# Patient Record
Sex: FEMALE | Race: WHITE | NOT HISPANIC OR LATINO | Employment: FULL TIME | URBAN - METROPOLITAN AREA
[De-identification: names, ages, dates, MRNs, and addresses within clinical notes are randomized per-mention and may not be internally consistent; named-entity substitution may affect disease eponyms.]

---

## 2018-05-22 ENCOUNTER — APPOINTMENT (OUTPATIENT)
Dept: LAB | Facility: CLINIC | Age: 23
End: 2018-05-22
Payer: COMMERCIAL

## 2018-05-22 ENCOUNTER — TRANSCRIBE ORDERS (OUTPATIENT)
Dept: LAB | Facility: CLINIC | Age: 23
End: 2018-05-22

## 2018-05-22 DIAGNOSIS — Z79.899 ENCOUNTER FOR LONG-TERM (CURRENT) USE OF MEDICATIONS: Primary | ICD-10-CM

## 2018-05-22 LAB
ANION GAP SERPL CALCULATED.3IONS-SCNC: 6 MMOL/L (ref 4–13)
B-HCG SERPL-ACNC: <2 MIU/ML
BUN SERPL-MCNC: 10 MG/DL (ref 5–25)
CALCIUM SERPL-MCNC: 8.4 MG/DL (ref 8.3–10.1)
CHLORIDE SERPL-SCNC: 107 MMOL/L (ref 100–108)
CO2 SERPL-SCNC: 26 MMOL/L (ref 21–32)
CREAT SERPL-MCNC: 0.77 MG/DL (ref 0.6–1.3)
GFR SERPL CREATININE-BSD FRML MDRD: 109 ML/MIN/1.73SQ M
GLUCOSE P FAST SERPL-MCNC: 96 MG/DL (ref 65–99)
POTASSIUM SERPL-SCNC: 3.9 MMOL/L (ref 3.5–5.3)
SODIUM SERPL-SCNC: 139 MMOL/L (ref 136–145)

## 2018-05-22 PROCEDURE — 80048 BASIC METABOLIC PNL TOTAL CA: CPT

## 2018-05-22 PROCEDURE — 84702 CHORIONIC GONADOTROPIN TEST: CPT

## 2018-05-22 PROCEDURE — 36415 COLL VENOUS BLD VENIPUNCTURE: CPT

## 2018-06-21 ENCOUNTER — APPOINTMENT (OUTPATIENT)
Dept: LAB | Facility: CLINIC | Age: 23
End: 2018-06-21
Payer: COMMERCIAL

## 2018-06-21 ENCOUNTER — TRANSCRIBE ORDERS (OUTPATIENT)
Dept: LAB | Facility: CLINIC | Age: 23
End: 2018-06-21

## 2018-06-21 DIAGNOSIS — Z79.899 ENCOUNTER FOR LONG-TERM (CURRENT) USE OF MEDICATIONS: Primary | ICD-10-CM

## 2018-06-21 LAB — POTASSIUM SERPL-SCNC: 3.9 MMOL/L (ref 3.5–5.3)

## 2018-06-21 PROCEDURE — 84132 ASSAY OF SERUM POTASSIUM: CPT

## 2018-06-21 PROCEDURE — 36415 COLL VENOUS BLD VENIPUNCTURE: CPT

## 2018-06-25 ENCOUNTER — OFFICE VISIT (OUTPATIENT)
Dept: FAMILY MEDICINE CLINIC | Facility: CLINIC | Age: 23
End: 2018-06-25
Payer: COMMERCIAL

## 2018-06-25 VITALS
TEMPERATURE: 98.3 F | HEIGHT: 64 IN | BODY MASS INDEX: 22.2 KG/M2 | SYSTOLIC BLOOD PRESSURE: 100 MMHG | WEIGHT: 130 LBS | DIASTOLIC BLOOD PRESSURE: 62 MMHG | OXYGEN SATURATION: 100 % | HEART RATE: 77 BPM

## 2018-06-25 DIAGNOSIS — H91.90 HEARING DIFFICULTY, UNSPECIFIED LATERALITY: Primary | ICD-10-CM

## 2018-06-25 PROCEDURE — 99213 OFFICE O/P EST LOW 20 MIN: CPT | Performed by: FAMILY MEDICINE

## 2018-06-25 PROCEDURE — 3008F BODY MASS INDEX DOCD: CPT | Performed by: FAMILY MEDICINE

## 2018-06-25 RX ORDER — DOXYCYCLINE HYCLATE 20 MG
TABLET ORAL
COMMUNITY
Start: 2018-06-19 | End: 2018-12-05

## 2018-06-25 RX ORDER — SPIRONOLACTONE 50 MG/1
TABLET, FILM COATED ORAL
COMMUNITY
Start: 2018-06-19 | End: 2019-10-21 | Stop reason: SDUPTHER

## 2018-06-25 RX ORDER — NORETHINDRONE ACETATE AND ETHINYL ESTRADIOL AND FERROUS FUMARATE 1.5-30(21)
KIT ORAL
COMMUNITY
Start: 2018-06-01 | End: 2018-10-05 | Stop reason: SDUPTHER

## 2018-06-25 NOTE — PROGRESS NOTES
Assessment/Plan:     Diagnoses and all orders for this visit:    Hearing difficulty, unspecified laterality  -     Cancel: Ambulatory Referral to Otolaryngology; Future  -     Ambulatory Referral to Otolaryngology; Future    BMI 22 0-22 9, adult    Other orders  -     doxycycline (PERIOSTAT) 20 MG tablet;   -     MICROGESTIN FE 1 5/30 1 5-30 MG-MCG tablet;   -     spironolactone (ALDACTONE) 50 mg tablet;   -     tretinoin (RETIN-A) 0 025 % cream;   -     Multiple Vitamin (MULTI-VITAMIN DAILY PO); Take by mouth      Joyce has been having decreased hearing for the past month with no improvement after a bout of cold-like symptoms  Hearing test conducted in-office today was within normal limits  On exam, she did have slightly bullous TM on right and serous fluid  Recommended continued observation for the time being  If no improvement in 3-4 weeks, referral provided for ENT for further evaluation  She acknowledged understanding and agreement with the plan    Subjective:      Patient ID: Robyn Johnson is a 21 y o  female  RIP Cook is a 21year old female that comes to the office due to concerns of hearing loss  She reports one month history of muffled sounds, worse on the right  It was preceded by cold-like symptoms  Over the past month, she has had no improvement in her symptoms  Denies fever, chills, ear pressure, ear pain, ear drainage  No history of ear infections as a child  Further denies current cold-like symptoms  She would like to get her hearing tested  The following portions of the patient's history were reviewed and updated as appropriate: allergies, current medications, past family history, past medical history, past social history, past surgical history and problem list     Review of Systems   Constitutional: Negative for chills and fever  HENT: Positive for hearing loss  Negative for congestion, dental problem, ear discharge, ear pain, rhinorrhea and sore throat      Eyes: Negative for visual disturbance  Respiratory: Negative for cough, shortness of breath and wheezing  Cardiovascular: Negative for chest pain and palpitations  Gastrointestinal: Negative for abdominal pain, constipation, diarrhea, nausea and vomiting  Genitourinary: Negative for dysuria  Musculoskeletal: Negative for myalgias  Skin: Negative for rash  Neurological: Negative for weakness, numbness and headaches  Psychiatric/Behavioral: Negative for confusion  Objective:      /62   Pulse 77   Temp 98 3 °F (36 8 °C) (Tympanic)   Ht 5' 4" (1 626 m)   Wt 59 kg (130 lb)   SpO2 100%   BMI 22 31 kg/m²          Physical Exam   Constitutional: She is oriented to person, place, and time  She appears well-developed and well-nourished  No distress  HENT:   Head: Normocephalic and atraumatic  Right Ear: External ear normal  No drainage or swelling  Tympanic membrane is not scarred and not erythematous  A middle ear effusion (serous) is present  No decreased hearing is noted  Left Ear: External ear normal  No drainage, swelling or tenderness  Tympanic membrane is not scarred and not erythematous  No middle ear effusion  No decreased hearing is noted  Bullous TM on right   Eyes: Conjunctivae and EOM are normal  Pupils are equal, round, and reactive to light  Right eye exhibits no discharge  Left eye exhibits no discharge  Neck: Neck supple  Cardiovascular: Normal rate, regular rhythm and normal heart sounds  No murmur heard  Pulmonary/Chest: Effort normal and breath sounds normal  No respiratory distress  She has no wheezes  Abdominal: Soft  Bowel sounds are normal  There is no tenderness  Musculoskeletal: Normal range of motion  She exhibits no edema or tenderness  Neurological: She is alert and oriented to person, place, and time  Skin: Skin is warm and dry  She is not diaphoretic  Psychiatric: She has a normal mood and affect

## 2018-09-14 ENCOUNTER — OFFICE VISIT (OUTPATIENT)
Dept: FAMILY MEDICINE CLINIC | Facility: CLINIC | Age: 23
End: 2018-09-14
Payer: COMMERCIAL

## 2018-09-14 VITALS
BODY MASS INDEX: 23 KG/M2 | SYSTOLIC BLOOD PRESSURE: 110 MMHG | TEMPERATURE: 98.5 F | WEIGHT: 134 LBS | DIASTOLIC BLOOD PRESSURE: 70 MMHG

## 2018-09-14 DIAGNOSIS — R59.0 LYMPHADENOPATHY OF RIGHT CERVICAL REGION: Primary | ICD-10-CM

## 2018-09-14 PROCEDURE — 99213 OFFICE O/P EST LOW 20 MIN: CPT | Performed by: NURSE PRACTITIONER

## 2018-09-14 NOTE — PROGRESS NOTES
Assessment/Plan:  1  Advised patient that if this lymph node becomes bigger her more painful to return to the office as well as if other symptoms developed  2  Advised patient that this lymph node may take up to a month to resolve and to return to the office at that time if she does not notice any improvement  3  Follow up if condition changes or worsens     Diagnoses and all orders for this visit:    Lymphadenopathy of right cervical region          Subjective:      Patient ID: León Sanchez is a 21 y o  female  45-year-old female presents with lump in the right side of her neck for couple of days  Denies any recent illnesses  Denies medications  The following portions of the patient's history were reviewed and updated as appropriate: allergies and current medications  Review of Systems   Constitutional: Negative  Skin:        Lump in right side of neck         Objective:      /70   Temp 98 5 °F (36 9 °C)   Wt 60 8 kg (134 lb)   LMP 08/24/2018   Breastfeeding? No   BMI 23 00 kg/m²          Physical Exam   Constitutional: She appears well-developed and well-nourished  Lymphadenopathy:     She has cervical adenopathy (Palpable Anterior cervical lymph node approximately half a cm/tender/mobile/no erythema)

## 2018-10-05 ENCOUNTER — OFFICE VISIT (OUTPATIENT)
Dept: OBGYN CLINIC | Facility: CLINIC | Age: 23
End: 2018-10-05
Payer: COMMERCIAL

## 2018-10-05 ENCOUNTER — APPOINTMENT (OUTPATIENT)
Dept: LAB | Facility: AMBULARY SURGERY CENTER | Age: 23
End: 2018-10-05
Payer: COMMERCIAL

## 2018-10-05 VITALS
DIASTOLIC BLOOD PRESSURE: 60 MMHG | HEIGHT: 64 IN | BODY MASS INDEX: 22.71 KG/M2 | SYSTOLIC BLOOD PRESSURE: 102 MMHG | WEIGHT: 133 LBS

## 2018-10-05 DIAGNOSIS — Z30.011 ENCOUNTER FOR INITIAL PRESCRIPTION OF CONTRACEPTIVE PILLS: ICD-10-CM

## 2018-10-05 DIAGNOSIS — Z11.3 SCREENING EXAMINATION FOR STD (SEXUALLY TRANSMITTED DISEASE): ICD-10-CM

## 2018-10-05 DIAGNOSIS — Z01.419 WELL WOMAN EXAM WITH ROUTINE GYNECOLOGICAL EXAM: Primary | ICD-10-CM

## 2018-10-05 LAB
HAV IGM SER QL: NORMAL
HBV CORE IGM SER QL: NORMAL
HBV SURFACE AG SER QL: NORMAL
HCV AB SER QL: NORMAL
RPR SER QL: NORMAL

## 2018-10-05 PROCEDURE — S0612 ANNUAL GYNECOLOGICAL EXAMINA: HCPCS | Performed by: OBSTETRICS & GYNECOLOGY

## 2018-10-05 PROCEDURE — 87389 HIV-1 AG W/HIV-1&-2 AB AG IA: CPT

## 2018-10-05 PROCEDURE — 36415 COLL VENOUS BLD VENIPUNCTURE: CPT

## 2018-10-05 PROCEDURE — 87491 CHLMYD TRACH DNA AMP PROBE: CPT | Performed by: OBSTETRICS & GYNECOLOGY

## 2018-10-05 PROCEDURE — 86592 SYPHILIS TEST NON-TREP QUAL: CPT

## 2018-10-05 PROCEDURE — 80074 ACUTE HEPATITIS PANEL: CPT

## 2018-10-05 PROCEDURE — G0145 SCR C/V CYTO,THINLAYER,RESCR: HCPCS | Performed by: OBSTETRICS & GYNECOLOGY

## 2018-10-05 PROCEDURE — 87591 N.GONORRHOEAE DNA AMP PROB: CPT | Performed by: OBSTETRICS & GYNECOLOGY

## 2018-10-05 RX ORDER — NORETHINDRONE ACETATE AND ETHINYL ESTRADIOL AND FERROUS FUMARATE 1.5-30(21)
1 KIT ORAL DAILY
Qty: 28 TABLET | Refills: 11 | Status: SHIPPED | OUTPATIENT
Start: 2018-10-05 | End: 2019-02-28 | Stop reason: SDUPTHER

## 2018-10-05 NOTE — PROGRESS NOTES
ASSESSMENT & PLAN: Silvio Plasencia is a 21 y o  Solomon Briceño with normal gynecologic exam     1   Routine well woman exam done today  2    Pap and HPV: Pap with reflex HPV was today  Current ASCCP Guidelines reviewed  3   The patient refused STD testing  chlamydia, gonorrhea, HIV and hepatitis, syphilis testing performed  Safe sex practices have been discussed  4   Gardasil is recommended for patients from 526 years of age  The patient has had the Gardasil vaccine series  5  The patient is sexually active  She accepted contraception and options have been discussed  6  The following were reviewed in today's visit: breast self exam, STD testing, HIV risk factors and prevention and use and side effects of OCPs  7  Patient to return to office in 12 months for annual well woman exam      All questions have been answered to her satisfaction  CC:  Annual Gynecologic Examination    HPI: Silvio Plasencia is a 21 y o  Solomon Briceño who presents for annual gynecologic examination  She has the following concerns:  Occasional spotting after sex  She denies any paid with sex  This has only happened a few times over the past month  She has had this once before about a year ago and she was diagnosed with chlamydia  She is currently with a different partner  She has not been tested since becoming sexually active with this partner  Health Maintenance:    She exercises 5 days per week  She wears her seatbelt routinely  She does not perform regular monthly self breast exams  She feels safe at home  She does follow a well balanced diet  She does not use tobacco    History reviewed  No pertinent past medical history  History reviewed  No pertinent surgical history      Past OB/Gyn History:  Period Cycle (Days): 28  Period Duration (Days): 4  Period Pattern: Regular  Menstrual Flow: Light  Menstrual Control: Tampon  Dysmenorrhea: (!) Moderate  Dysmenorrhea Symptoms: CrampingPatient's last menstrual period was 2018  Menstrual History:  OB History      Para Term  AB Living    0 0 0 0 0 0    SAB TAB Ectopic Multiple Live Births    0 0 0 0 0         Menarche age: 15  History of sexually transmitted infection Yes - chlamydia (2017)  Patient is currently sexually active  heterosexual Birth control: combination OCPs  Obstetric History       T0      L0     SAB0   TAB0   Ectopic0   Multiple0   Live Births0           Family History:  Family History   Problem Relation Age of Onset   Tiara Hollins Chang's esophagus Mother     Breast cancer Paternal Grandfather        Social History:  Social History     Social History    Marital status: Single     Spouse name: N/A    Number of children: N/A    Years of education: N/A     Occupational History    Not on file  Social History Main Topics    Smoking status: Never Smoker    Smokeless tobacco: Never Used    Alcohol use Yes      Comment: social    Drug use: No    Sexual activity: Yes     Partners: Male     Birth control/ protection: OCP     Other Topics Concern    Not on file     Social History Narrative    Lives with parents per [employer]     Presently lives with parents  Patient is single  Allergies   Allergen Reactions    Sulfa Antibiotics Hives and Rash       Current Outpatient Prescriptions:     doxycycline (PERIOSTAT) 20 MG tablet, , Disp: , Rfl:     MICROGESTIN FE  1 5-30 MG-MCG tablet, , Disp: , Rfl:     Multiple Vitamin (MULTI-VITAMIN DAILY PO), Take by mouth, Disp: , Rfl:     spironolactone (ALDACTONE) 50 mg tablet, , Disp: , Rfl:     tretinoin (RETIN-A) 0 025 % cream, , Disp: , Rfl:     Review of Systems:  Denies fevers, chills, unintentional weight loss, excessive fatigue, chest pain, shortness of breath, abdominal pain, nausea, vomiting, urinary incontinence, urinary frequency, vaginal bleeding, vaginal discharge  All other systems negative unless otherwise stated       Physical Exam:  /60   Ht 5' 3 5" (1 613 m) Wt 60 3 kg (133 lb)   LMP 09/17/2018   BMI 23 19 kg/m²  Body mass index is 23 19 kg/m²  GEN: The patient was alert and oriented x3, pleasant well-appearing female in no acute distress  HEENT:  Unremarkable, no anterior or posterior lymphadenopathy, no thyromegaly  CV:  Regular rate and rhythm, normal S1 and S2, no murmurs  RESP:  Clear to auscultation bilaterally, no wheezes, rales or rhonchi  BREAST:  Symmetric breasts with no palpable breast masses or obvious breast lesions  She has no retractions or nipple discharge  She has no axillary abnormalities or palpable masses  GI:  Soft, nontender, non-distended  MSK: bilateral lower extremities are nontender, no edema  : Normal appearing external female genitalia, normal appearing urethral meatus  On sterile speculum exam, normal appearing vaginal epithelium, no vaginal discharge, no bleeding, cervix appears friable but otherwise normal, no obvious lesions  On bimanual exam, no cervical motion tenderness; uterus is smooth, mobile, nontender 6 weeks size  No tenderness or fullness in the bilateral adnexa

## 2018-10-08 LAB
CHLAMYDIA DNA CVX QL NAA+PROBE: ABNORMAL
HIV 1+2 AB+HIV1 P24 AG SERPL QL IA: NORMAL
N GONORRHOEA DNA GENITAL QL NAA+PROBE: ABNORMAL

## 2018-10-09 DIAGNOSIS — A74.9 CHLAMYDIA: Primary | ICD-10-CM

## 2018-10-09 RX ORDER — AZITHROMYCIN 500 MG/1
TABLET, FILM COATED ORAL
Qty: 2 TABLET | Refills: 0 | Status: SHIPPED | OUTPATIENT
Start: 2018-10-09 | End: 2018-10-09

## 2018-10-10 LAB
LAB AP GYN PRIMARY INTERPRETATION: NORMAL
Lab: NORMAL

## 2018-12-05 ENCOUNTER — OFFICE VISIT (OUTPATIENT)
Dept: URGENT CARE | Facility: CLINIC | Age: 23
End: 2018-12-05
Payer: COMMERCIAL

## 2018-12-05 VITALS
HEIGHT: 64 IN | DIASTOLIC BLOOD PRESSURE: 72 MMHG | BODY MASS INDEX: 22.88 KG/M2 | SYSTOLIC BLOOD PRESSURE: 120 MMHG | HEART RATE: 82 BPM | TEMPERATURE: 98.7 F | WEIGHT: 134 LBS | OXYGEN SATURATION: 100 % | RESPIRATION RATE: 18 BRPM

## 2018-12-05 DIAGNOSIS — J02.9 ACUTE PHARYNGITIS, UNSPECIFIED ETIOLOGY: Primary | ICD-10-CM

## 2018-12-05 PROCEDURE — 99213 OFFICE O/P EST LOW 20 MIN: CPT | Performed by: PHYSICIAN ASSISTANT

## 2018-12-05 PROCEDURE — 87070 CULTURE OTHR SPECIMN AEROBIC: CPT | Performed by: PHYSICIAN ASSISTANT

## 2018-12-05 NOTE — PROGRESS NOTES
[facility]        NAME: Chandrakant Abraham is a 21 y o  female  : 1995    MRN: [id number]  DATE: 2018  TIME: 6:15 PM    Assessment and Plan   Acute pharyngitis, unspecified etiology [J02 9]  1  Acute pharyngitis, unspecified etiology  Throat culture     Patient Instructions   Begin salt water gargles, warm tea with honey, and using throat lozenges for throat relief  Call in 48-72 hours for the result of your throat culture  An antibiotic will be started at this time if necessary  Follow up with your family doctor in 3-5 days  Proceed to the ER if symptoms worsen  Discussed with patient unable to perform rapid strep in this office  Will send for culture  tx plan reviewed in length  All questions answered  Precautions given  Pt verbalized understanding and agreement with this tx plan  Chief Complaint     Chief Complaint   Patient presents with    Sore Throat     Sore throat since Saturday with sl  dry cough  Today can "taste blood"  Took DayQuil at 6 am       History of Present Illness     20 y/o female presenting with c/o sore throat x 4 days  Sore throat is constant, worse with swallowing  She denies difficulty swallowing  She notes an occasional dry cough, but states this is not bothersome and is unsure of known exacerbations  She has been treating with Dayquil and Nyquil, noting nyquil helps her to fall asleep  She notes a friend sick with a cold, but otherwise no sick contacts  No recent travel  She did not have the flu vaccine  Otherwise UTD on vaccines  Review of Systems   Review of Systems   Constitutional: Negative for chills, diaphoresis, fatigue and fever  HENT: Negative for congestion, ear pain, postnasal drip and rhinorrhea  Respiratory: Negative for cough, shortness of breath and wheezing  Cardiovascular: Negative for chest pain and palpitations  Gastrointestinal: Negative for abdominal pain, diarrhea, nausea and vomiting     Musculoskeletal: Negative for arthralgias and myalgias  Skin: Negative for rash  Neurological: Negative for dizziness, light-headedness and headaches  Current Medications       Current Outpatient Prescriptions:     MICROGESTIN FE 1 5/30 1 5-30 MG-MCG tablet, Take 1 tablet by mouth daily, Disp: 28 tablet, Rfl: 11    Multiple Vitamin (MULTI-VITAMIN DAILY PO), Take by mouth, Disp: , Rfl:     spironolactone (ALDACTONE) 50 mg tablet, , Disp: , Rfl:     tretinoin (RETIN-A) 0 025 % cream, , Disp: , Rfl:     Current Allergies     Allergies as of 12/05/2018 - Reviewed 12/05/2018   Allergen Reaction Noted    Sulfa antibiotics Hives and Rash 11/08/2013          The following portions of the patient's history were reviewed and updated as appropriate: allergies, current medications, past family history, past medical history, past social history, past surgical history and problem list      Past Medical History:   Diagnosis Date    Acne      Past Surgical History:   Procedure Laterality Date    NO PAST SURGERIES       Family History   Problem Relation Age of Onset   Joe Pert Chang's esophagus Mother     Breast cancer Paternal Grandfather      Medications have been verified  Objective   /72 (BP Location: Left arm, Patient Position: Sitting, Cuff Size: Standard)   Pulse 82   Temp 98 7 °F (37 1 °C) (Tympanic)   Resp 18   Ht 5' 4" (1 626 m)   Wt 60 8 kg (134 lb)   LMP 11/07/2018 (Approximate)   SpO2 100%   BMI 23 00 kg/m²      Physical Exam     Physical Exam   Constitutional: She is oriented to person, place, and time  Vital signs are normal  She appears well-developed and well-nourished  She is cooperative  She does not appear ill  No distress  HENT:   Head: Normocephalic and atraumatic  Right Ear: Hearing, tympanic membrane, external ear and ear canal normal    Left Ear: Hearing, tympanic membrane, external ear and ear canal normal    Nose: Nose normal  No mucosal edema or rhinorrhea  Mouth/Throat: Uvula is midline   Mucous membranes are not pale, not dry and not cyanotic  No oral lesions  Oropharyngeal exudate (White patchy exudate on right tonsil ) and posterior oropharyngeal erythema present  No posterior oropharyngeal edema or tonsillar abscesses  Tonsils grade II b/l  No petechiae  Eyes: Conjunctivae are normal  Right eye exhibits no discharge  Left eye exhibits no discharge  No scleral icterus  Neck: Neck supple  Cardiovascular: Normal rate, regular rhythm and normal heart sounds  Pulmonary/Chest: Effort normal and breath sounds normal  No respiratory distress  She has no decreased breath sounds  She has no wheezes  She has no rhonchi  She has no rales  Abdominal: Soft  Bowel sounds are normal  She exhibits no distension and no mass  There is no tenderness  There is no rebound and no guarding  Lymphadenopathy:     She has no cervical adenopathy  Neurological: She is alert and oriented to person, place, and time  She has normal reflexes  No cranial nerve deficit  She exhibits normal muscle tone  Coordination normal    Skin: Skin is warm and dry  No rash noted  She is not diaphoretic  Psychiatric: She has a normal mood and affect  Her behavior is normal    Nursing note and vitals reviewed

## 2018-12-05 NOTE — PATIENT INSTRUCTIONS
Begin salt water gargles, warm tea with honey, and using throat lozenges for throat relief  Call in 48-72 hours for the result of your throat culture  An antibiotic will be started at this time if necessary  Follow up with your family doctor in 3-5 days  Proceed to the ER if symptoms worsen

## 2018-12-06 ENCOUNTER — TELEPHONE (OUTPATIENT)
Dept: URGENT CARE | Facility: CLINIC | Age: 23
End: 2018-12-06

## 2018-12-07 LAB — BACTERIA THROAT CULT: NORMAL

## 2019-01-14 ENCOUNTER — APPOINTMENT (OUTPATIENT)
Dept: LAB | Facility: AMBULARY SURGERY CENTER | Age: 24
End: 2019-01-14
Attending: OBSTETRICS & GYNECOLOGY
Payer: COMMERCIAL

## 2019-01-14 DIAGNOSIS — A74.9 CHLAMYDIA: ICD-10-CM

## 2019-01-14 PROCEDURE — 87591 N.GONORRHOEAE DNA AMP PROB: CPT

## 2019-01-14 PROCEDURE — 87491 CHLMYD TRACH DNA AMP PROBE: CPT

## 2019-01-16 LAB
C TRACH DNA SPEC QL NAA+PROBE: NEGATIVE
N GONORRHOEA DNA SPEC QL NAA+PROBE: NEGATIVE

## 2019-02-28 DIAGNOSIS — Z30.011 ENCOUNTER FOR INITIAL PRESCRIPTION OF CONTRACEPTIVE PILLS: ICD-10-CM

## 2019-02-28 RX ORDER — NORETHINDRONE ACETATE AND ETHINYL ESTRADIOL AND FERROUS FUMARATE 1.5-30(21)
1 KIT ORAL DAILY
Qty: 28 TABLET | Refills: 11 | Status: SHIPPED | OUTPATIENT
Start: 2019-02-28 | End: 2019-03-06 | Stop reason: SDUPTHER

## 2019-02-28 NOTE — TELEPHONE ENCOUNTER
Pt has a new insurance and would like the rest of her birth control script sent to the [facility] in [place] (updated in chart)  Thank you!

## 2019-03-05 ENCOUNTER — TELEPHONE (OUTPATIENT)
Dept: OBGYN CLINIC | Facility: CLINIC | Age: 24
End: 2019-03-05

## 2019-03-05 NOTE — TELEPHONE ENCOUNTER
Mother calling states she has to use [facility]  Please sent Migrogestin to [id number]  Pharmacy updated  Routing to provider to order

## 2019-03-06 DIAGNOSIS — Z30.011 ENCOUNTER FOR INITIAL PRESCRIPTION OF CONTRACEPTIVE PILLS: ICD-10-CM

## 2019-03-06 RX ORDER — NORETHINDRONE ACETATE AND ETHINYL ESTRADIOL AND FERROUS FUMARATE 1.5-30(21)
1 KIT ORAL DAILY
Qty: 84 TABLET | Refills: 3 | Status: SHIPPED | OUTPATIENT
Start: 2019-03-06 | End: 2020-03-05

## 2019-03-06 NOTE — TELEPHONE ENCOUNTER
Spoke with mother advised prescription has been sent to [facility] order  Verbalized understanding

## 2019-05-21 ENCOUNTER — TELEPHONE (OUTPATIENT)
Dept: FAMILY MEDICINE CLINIC | Facility: CLINIC | Age: 24
End: 2019-05-21

## 2019-05-22 ENCOUNTER — TRANSCRIBE ORDERS (OUTPATIENT)
Dept: ADMINISTRATIVE | Facility: HOSPITAL | Age: 24
End: 2019-05-22

## 2019-05-22 DIAGNOSIS — R53.83 FATIGUE, UNSPECIFIED TYPE: Primary | ICD-10-CM

## 2019-05-23 ENCOUNTER — APPOINTMENT (OUTPATIENT)
Dept: LAB | Facility: HOSPITAL | Age: 24
End: 2019-05-23
Attending: FAMILY MEDICINE
Payer: COMMERCIAL

## 2019-05-23 ENCOUNTER — TRANSCRIBE ORDERS (OUTPATIENT)
Dept: ADMINISTRATIVE | Facility: HOSPITAL | Age: 24
End: 2019-05-23

## 2019-05-23 DIAGNOSIS — R53.83 FATIGUE, UNSPECIFIED TYPE: ICD-10-CM

## 2019-05-23 LAB
ERYTHROCYTE [DISTWIDTH] IN BLOOD BY AUTOMATED COUNT: 12 % (ref 11.6–15.1)
HCT VFR BLD AUTO: 39.2 % (ref 34.8–46.1)
HGB BLD-MCNC: 13.5 G/DL (ref 11.5–15.4)
MCH RBC QN AUTO: 31.5 PG (ref 26.8–34.3)
MCHC RBC AUTO-ENTMCNC: 34.4 G/DL (ref 31.4–37.4)
MCV RBC AUTO: 91 FL (ref 82–98)
PLATELET # BLD AUTO: 256 THOUSANDS/UL (ref 149–390)
PMV BLD AUTO: 11.2 FL (ref 8.9–12.7)
RBC # BLD AUTO: 4.29 MILLION/UL (ref 3.81–5.12)
TSH SERPL DL<=0.05 MIU/L-ACNC: 1.17 UIU/ML (ref 0.36–3.74)
WBC # BLD AUTO: 5.64 THOUSAND/UL (ref 4.31–10.16)

## 2019-05-23 PROCEDURE — 86617 LYME DISEASE ANTIBODY: CPT

## 2019-05-23 PROCEDURE — 85027 COMPLETE CBC AUTOMATED: CPT

## 2019-05-23 PROCEDURE — 36415 COLL VENOUS BLD VENIPUNCTURE: CPT

## 2019-05-23 PROCEDURE — 84443 ASSAY THYROID STIM HORMONE: CPT

## 2019-05-23 PROCEDURE — 86618 LYME DISEASE ANTIBODY: CPT

## 2019-05-24 ENCOUNTER — TELEPHONE (OUTPATIENT)
Dept: FAMILY MEDICINE CLINIC | Facility: CLINIC | Age: 24
End: 2019-05-24

## 2019-05-25 LAB
B BURGDOR IGG SER IA-ACNC: 0.07
B BURGDOR IGM SER IA-ACNC: 1.42

## 2019-05-28 LAB
B BURGDOR IGG PATRN SER IB-IMP: NEGATIVE
B BURGDOR IGM PATRN SER IB-IMP: NEGATIVE
B BURGDOR18KD IGG SER QL IB: NORMAL
B BURGDOR23KD IGG SER QL IB: NORMAL
B BURGDOR23KD IGM SER QL IB: NORMAL
B BURGDOR28KD IGG SER QL IB: NORMAL
B BURGDOR30KD IGG SER QL IB: NORMAL
B BURGDOR39KD IGG SER QL IB: NORMAL
B BURGDOR39KD IGM SER QL IB: NORMAL
B BURGDOR41KD IGG SER QL IB: NORMAL
B BURGDOR41KD IGM SER QL IB: NORMAL
B BURGDOR45KD IGG SER QL IB: NORMAL
B BURGDOR58KD IGG SER QL IB: NORMAL
B BURGDOR66KD IGG SER QL IB: NORMAL
B BURGDOR93KD IGG SER QL IB: NORMAL

## 2019-05-29 ENCOUNTER — TELEPHONE (OUTPATIENT)
Dept: FAMILY MEDICINE CLINIC | Facility: CLINIC | Age: 24
End: 2019-05-29

## 2019-05-29 ENCOUNTER — OFFICE VISIT (OUTPATIENT)
Dept: FAMILY MEDICINE CLINIC | Facility: CLINIC | Age: 24
End: 2019-05-29
Payer: COMMERCIAL

## 2019-05-29 VITALS
HEART RATE: 83 BPM | DIASTOLIC BLOOD PRESSURE: 70 MMHG | HEIGHT: 64 IN | BODY MASS INDEX: 22.53 KG/M2 | SYSTOLIC BLOOD PRESSURE: 110 MMHG | WEIGHT: 132 LBS | OXYGEN SATURATION: 100 %

## 2019-05-29 DIAGNOSIS — F32.A ANXIETY AND DEPRESSION: Primary | ICD-10-CM

## 2019-05-29 DIAGNOSIS — F41.9 ANXIETY AND DEPRESSION: Primary | ICD-10-CM

## 2019-05-29 PROCEDURE — 99213 OFFICE O/P EST LOW 20 MIN: CPT | Performed by: FAMILY MEDICINE

## 2019-05-29 PROCEDURE — 3008F BODY MASS INDEX DOCD: CPT | Performed by: FAMILY MEDICINE

## 2019-05-29 RX ORDER — ESCITALOPRAM OXALATE 10 MG/1
10 TABLET ORAL DAILY
Qty: 90 TABLET | Refills: 1 | Status: SHIPPED | OUTPATIENT
Start: 2019-05-29 | End: 2019-07-03

## 2019-07-03 DIAGNOSIS — F41.9 ANXIETY: Primary | ICD-10-CM

## 2019-07-03 RX ORDER — CITALOPRAM 20 MG/1
20 TABLET ORAL DAILY
Qty: 30 TABLET | Refills: 5 | Status: SHIPPED | OUTPATIENT
Start: 2019-07-03 | End: 2019-07-04

## 2019-07-04 DIAGNOSIS — F41.9 ANXIETY: ICD-10-CM

## 2019-07-04 RX ORDER — CITALOPRAM 20 MG/1
20 TABLET ORAL DAILY
Qty: 30 TABLET | Refills: 5 | Status: SHIPPED | OUTPATIENT
Start: 2019-07-04 | End: 2019-08-05 | Stop reason: SDUPTHER

## 2019-07-10 ENCOUNTER — APPOINTMENT (OUTPATIENT)
Dept: LAB | Facility: HOSPITAL | Age: 24
End: 2019-07-10
Payer: COMMERCIAL

## 2019-07-10 ENCOUNTER — TRANSCRIBE ORDERS (OUTPATIENT)
Dept: ADMINISTRATIVE | Facility: HOSPITAL | Age: 24
End: 2019-07-10

## 2019-07-10 DIAGNOSIS — Z79.899 ENCOUNTER FOR LONG-TERM (CURRENT) USE OF OTHER MEDICATIONS: Primary | ICD-10-CM

## 2019-07-10 LAB
ANION GAP SERPL CALCULATED.3IONS-SCNC: 7 MMOL/L (ref 4–13)
BUN SERPL-MCNC: 15 MG/DL (ref 5–25)
CALCIUM SERPL-MCNC: 8.4 MG/DL (ref 8.3–10.1)
CHLORIDE SERPL-SCNC: 105 MMOL/L (ref 100–108)
CO2 SERPL-SCNC: 28 MMOL/L (ref 21–32)
CREAT SERPL-MCNC: 0.71 MG/DL (ref 0.6–1.3)
GFR SERPL CREATININE-BSD FRML MDRD: 120 ML/MIN/1.73SQ M
GLUCOSE P FAST SERPL-MCNC: 84 MG/DL (ref 65–99)
POTASSIUM SERPL-SCNC: 3.9 MMOL/L (ref 3.5–5.3)
SODIUM SERPL-SCNC: 140 MMOL/L (ref 136–145)

## 2019-07-10 PROCEDURE — 80048 BASIC METABOLIC PNL TOTAL CA: CPT | Performed by: PHYSICIAN ASSISTANT

## 2019-07-10 PROCEDURE — 36415 COLL VENOUS BLD VENIPUNCTURE: CPT | Performed by: PHYSICIAN ASSISTANT

## 2019-08-05 DIAGNOSIS — F41.9 ANXIETY: ICD-10-CM

## 2019-08-06 RX ORDER — CITALOPRAM 20 MG/1
20 TABLET ORAL DAILY
Qty: 30 TABLET | Refills: 5 | Status: SHIPPED | OUTPATIENT
Start: 2019-08-06 | End: 2020-01-13 | Stop reason: SDUPTHER

## 2019-08-26 ENCOUNTER — APPOINTMENT (OUTPATIENT)
Dept: RADIOLOGY | Facility: CLINIC | Age: 24
End: 2019-08-26
Payer: COMMERCIAL

## 2019-08-26 ENCOUNTER — OFFICE VISIT (OUTPATIENT)
Dept: URGENT CARE | Facility: CLINIC | Age: 24
End: 2019-08-26
Payer: COMMERCIAL

## 2019-08-26 VITALS
SYSTOLIC BLOOD PRESSURE: 136 MMHG | HEIGHT: 64 IN | TEMPERATURE: 98.2 F | BODY MASS INDEX: 23.66 KG/M2 | WEIGHT: 138.6 LBS | RESPIRATION RATE: 16 BRPM | DIASTOLIC BLOOD PRESSURE: 80 MMHG | OXYGEN SATURATION: 100 % | HEART RATE: 80 BPM

## 2019-08-26 DIAGNOSIS — L03.012 PARONYCHIA OF LEFT INDEX FINGER: ICD-10-CM

## 2019-08-26 DIAGNOSIS — M79.642 LEFT HAND PAIN: ICD-10-CM

## 2019-08-26 DIAGNOSIS — S60.122A CONTUSION OF LEFT INDEX FINGER WITH DAMAGE TO NAIL, INITIAL ENCOUNTER: Primary | ICD-10-CM

## 2019-08-26 PROCEDURE — 99213 OFFICE O/P EST LOW 20 MIN: CPT | Performed by: PHYSICIAN ASSISTANT

## 2019-08-26 PROCEDURE — 90471 IMMUNIZATION ADMIN: CPT

## 2019-08-26 PROCEDURE — 73130 X-RAY EXAM OF HAND: CPT

## 2019-08-26 PROCEDURE — 90715 TDAP VACCINE 7 YRS/> IM: CPT

## 2019-08-26 RX ORDER — CEPHALEXIN 500 MG/1
500 CAPSULE ORAL EVERY 8 HOURS SCHEDULED
Qty: 21 CAPSULE | Refills: 0 | Status: SHIPPED | OUTPATIENT
Start: 2019-08-26 | End: 2019-09-02

## 2019-08-26 NOTE — PATIENT INSTRUCTIONS
Rest and elevate your hand as often as possible  Apply ice for 20-30 minutes at a time, 3-4 times per day  Take  Advil or Tylenol as directed for pain  If your nail begins to fall off, do not rip it away  Apply a bandage and allow it to fall off naturally  Take the antibiotic as directed with food and water  Take a probiotic while taking this medication  Apply a warm compress or soak the finger for 15-20 minutes at a time, 2-3 times daily  After soaking, milk your finger going away from your body  Keep the area clean washing with soap and water  Pat dry  Monitor for signs of worsening infection including increasing redness, streaking redness, persistent pus formation, fevers, chills, and sweats  Seek care immediately if these symptoms occur  Follow up with your family doctor in 3-5 days  Proceed to the ER if symptoms worsen

## 2019-08-26 NOTE — PROGRESS NOTES
[facility]        NAME: Rosetta Jamil is a 25 y o  female  : 1995    MRN: [id number]  DATE: 2019  TIME: 5:51 PM    Assessment and Plan   Contusion of left index finger with damage to nail, initial encounter [S60 122A]  1  Contusion of left index finger with damage to nail, initial encounter  XR hand 3+ vw left    TDAP Vaccine greater than or equal to 6yo   2  Paronychia of left index finger  cephalexin (KEFLEX) 500 mg capsule     Patient Instructions   Rest and elevate your hand as often as possible  Apply ice for 20-30 minutes at a time, 3-4 times per day  Take  Advil or Tylenol as directed for pain  If your nail begins to fall off, do not rip it away  Apply a bandage and allow it to fall off naturally  Take the antibiotic as directed with food and water  Take a probiotic while taking this medication  Apply a warm compress or soak the finger for 15-20 minutes at a time, 2-3 times daily  After soaking, milk your finger going away from your body  Keep the area clean washing with soap and water  Pat dry  Monitor for signs of worsening infection including increasing redness, streaking redness, persistent pus formation, fevers, chills, and sweats  Seek care immediately if these symptoms occur  Follow up with your family doctor in 3-5 days  Proceed to the ER if symptoms worsen  Chief Complaint     Chief Complaint   Patient presents with    Hand Injury     L index finger pain and swelling after hitting with hammer yesterday  Nail appears blue, pt tried to drill hold in it to drain blood, no relief  Taking advil for pain     History of Present Illness   59-year-old female presenting with complaint of left index finger pain x1 day  Pain began after she accidentally struck her finger with a hammer yesterday  She reports her father attempted to remove bruising from beneath the nail using a drill bit, which was successful of draining a small amount of blood   Since then reports swelling of the distal finger with associated sharp and throbbing pain  Pain is worse with movement, with pt noting decreased mobility of the digit  Notes tingling sensation with pressure application, otherwise no numbness or weakness  No previous injury to this digit  RHD  Unsure of last tetanus date  Review of Systems   Review of Systems   Constitutional: Negative for chills, diaphoresis and fever  Gastrointestinal: Negative for abdominal pain, nausea and vomiting  Musculoskeletal: Positive for arthralgias  Skin: Positive for color change  Neurological: Negative for weakness, numbness and headaches  Current Medications       Current Outpatient Medications:     citalopram (CeleXA) 20 mg tablet, Take 1 tablet (20 mg total) by mouth daily, Disp: 30 tablet, Rfl: 5    MICROGESTIN FE 1 5/30 1 5-30 MG-MCG tablet, Take 1 tablet by mouth daily, Disp: 84 tablet, Rfl: 3    Multiple Vitamin (MULTI-VITAMIN DAILY PO), Take by mouth, Disp: , Rfl:     spironolactone (ALDACTONE) 50 mg tablet, , Disp: , Rfl:     tretinoin (RETIN-A) 0 025 % cream, , Disp: , Rfl:     cephalexin (KEFLEX) 500 mg capsule, Take 1 capsule (500 mg total) by mouth every 8 (eight) hours for 7 days, Disp: 21 capsule, Rfl: 0    Current Allergies     Allergies as of 08/26/2019 - Reviewed 08/26/2019   Allergen Reaction Noted    Sulfa antibiotics Hives and Rash 11/08/2013            The following portions of the patient's history were reviewed and updated as appropriate: allergies, current medications, past family history, past medical history, past social history, past surgical history and problem list      Past Medical History:   Diagnosis Date    Acne        Past Surgical History:   Procedure Laterality Date    NO PAST SURGERIES         Family History   Problem Relation Age of Onset    Chang's esophagus Mother     Breast cancer Paternal Grandfather      Medications have been verified      Objective   /80 (BP Location: Right arm, Patient Position: Sitting, Cuff Size: Standard)   Pulse 80   Temp 98 2 °F (36 8 °C) (Tympanic)   Resp 16   Ht 5' 4" (1 626 m)   Wt 62 9 kg (138 lb 9 6 oz)   SpO2 100%   BMI 23 79 kg/m²      Left hand XR: no acute osseous abn  Physical Exam     Physical Exam   Constitutional: She is oriented to person, place, and time  Vital signs are normal  She appears well-developed and well-nourished  She is cooperative  She does not appear ill  No distress  HENT:   Head: Normocephalic and atraumatic  Eyes: Conjunctivae and lids are normal  Right eye exhibits no chemosis, no discharge and no exudate  Left eye exhibits no chemosis, no discharge and no exudate  Right conjunctiva is not injected  Left conjunctiva is not injected  Cardiovascular: Normal rate, regular rhythm and normal heart sounds  Exam reveals no gallop, no distant heart sounds and no friction rub  No murmur heard  Pulmonary/Chest: Effort normal and breath sounds normal  No stridor  No respiratory distress  She has no decreased breath sounds  She has no wheezes  She has no rhonchi  She has no rales  Musculoskeletal:   Edema and ecchymosis extending distally from the DIP joint of the left index finger  Area is TTP  LROM at DIP secondary to pain and edema  No overt or palpable deformity  No crepitus  Puncture wound near base of 2nd nail consistent with hx of attempts at trephination  Subungual hematoma occupying 60% of nail bed  Mild warm erythema extending 0 5 cm from cuticle line with non fluctuant pallor at base of nail bed, likely paronychia secondary to trauma  Distal digit is neurovascularly intact  Digital strength 5/5  Neurological: She is alert and oriented to person, place, and time  She has normal strength  She is not disoriented  No cranial nerve deficit  She exhibits normal muscle tone  Coordination and gait normal    Skin: Skin is warm, dry and intact  No rash noted  She is not diaphoretic  No erythema  No pallor     Psychiatric: She has a normal mood and affect  Her behavior is normal  Judgment and thought content normal    Nursing note and vitals reviewed

## 2019-10-21 DIAGNOSIS — L70.9 ACNE, UNSPECIFIED ACNE TYPE: Primary | ICD-10-CM

## 2019-10-21 RX ORDER — SPIRONOLACTONE 50 MG/1
50 TABLET, FILM COATED ORAL DAILY
Qty: 45 TABLET | Refills: 0 | Status: SHIPPED | OUTPATIENT
Start: 2019-10-21 | End: 2019-11-26 | Stop reason: SDUPTHER

## 2019-10-31 ENCOUNTER — ANNUAL EXAM (OUTPATIENT)
Dept: OBGYN CLINIC | Facility: CLINIC | Age: 24
End: 2019-10-31
Payer: COMMERCIAL

## 2019-10-31 VITALS
BODY MASS INDEX: 23.73 KG/M2 | HEIGHT: 64 IN | SYSTOLIC BLOOD PRESSURE: 120 MMHG | WEIGHT: 139 LBS | DIASTOLIC BLOOD PRESSURE: 70 MMHG

## 2019-10-31 DIAGNOSIS — Z01.419 WELL WOMAN EXAM WITH ROUTINE GYNECOLOGICAL EXAM: Primary | ICD-10-CM

## 2019-10-31 PROCEDURE — 99395 PREV VISIT EST AGE 18-39: CPT | Performed by: OBSTETRICS & GYNECOLOGY

## 2019-10-31 NOTE — PROGRESS NOTES
ASSESSMENT & PLAN: Karine Sawyer is a 25 y o  Ana Javier with normal gynecologic exam     1   Routine well woman exam done today  2   Pap and HPV: Pap with reflex HPV was not today  Current ASCCP Guidelines reviewed  Next pap due 2021  3  The patient declined STD testing  She has not had a new partner since her last negative GCC test    4   Gardasil is recommended for patients from 526 years of age  The patient has had the Gardasil vaccine series  5  The patient is sexually active  She relies on OCPs for contraception and options have been discussed  She is up to date on refills currently - will need more in March  6  The following were reviewed in today's visit: breast self exam, STD testing, use and side effects of OCPs, exercise and healthy diet  7  Patient to return to office in 12 months for annual exam      All questions have been answered to her satisfaction  CC:  Annual Gynecologic Examination    HPI: Karine Sawyer is a 25 y o  Ana Javier who presents for annual gynecologic examination  She has the following concerns:  none    Health Maintenance:    She exercises 3 days per week  She wears her seatbelt routinely  She does not perform regular monthly self breast exams  She feels safe at home  She does follow a well balanced diet  She does not use tobacco    Past Medical History:   Diagnosis Date    Acne        Past Surgical History:   Procedure Laterality Date    NO PAST SURGERIES         Past OB/Gyn History:      Period Cycle (Days): 30  Period Duration (Days): 4-5  Period Pattern: Regular  Menstrual Flow: Light, Moderate  Dysmenorrhea: (!) Mild  Dysmenorrhea Symptoms: CrampingPatient's last menstrual period was 10/08/2019  History of sexually transmitted infection yes, chlamydia  Patient is currently sexually active  heterosexual Birth control: combination OCPs  Last Pap 10/2018 :  no abnormalities        Family History:  Family History   Problem Relation Age of Onset    Chang's esophagus Mother     Breast cancer Paternal Grandfather        Social History:  Social History     Socioeconomic History    Marital status: Single     Spouse name: Not on file    Number of children: Not on file    Years of education: Not on file    Highest education level: Not on file   Occupational History    Not on file   Social Needs    Financial resource strain: Not on file    Food insecurity:     Worry: Not on file     Inability: Not on file    Transportation needs:     Medical: Not on file     Non-medical: Not on file   Tobacco Use    Smoking status: Never Smoker    Smokeless tobacco: Never Used   Substance and Sexual Activity    Alcohol use: Yes     Alcohol/week: 3 0 standard drinks     Types: 3 Standard drinks or equivalent per week     Comment: Occasionally    Drug use: No    Sexual activity: Yes     Partners: Male     Birth control/protection: OCP   Lifestyle    Physical activity:     Days per week: Not on file     Minutes per session: Not on file    Stress: Not on file   Relationships    Social connections:     Talks on phone: Not on file     Gets together: Not on file     Attends Congregational service: Not on file     Active member of club or organization: Not on file     Attends meetings of clubs or organizations: Not on file     Relationship status: Not on file    Intimate partner violence:     Fear of current or ex partner: Not on file     Emotionally abused: Not on file     Physically abused: Not on file     Forced sexual activity: Not on file   Other Topics Concern    Not on file   Social History Narrative    Lives with parents per [employer]     Presently lives with her parents and brother  Patient is single        Allergies   Allergen Reactions    Sulfa Antibiotics Hives and Rash       Current Outpatient Medications:     citalopram (CeleXA) 20 mg tablet, Take 1 tablet (20 mg total) by mouth daily, Disp: 30 tablet, Rfl: 5    MICROGESTIN FE 1 5/30 1 5-30 MG-MCG tablet, Take 1 tablet by mouth daily, Disp: 84 tablet, Rfl: 3    Multiple Vitamin (MULTI-VITAMIN DAILY PO), Take by mouth, Disp: , Rfl:     spironolactone (ALDACTONE) 50 mg tablet, Take 1 tablet (50 mg total) by mouth daily, Disp: 45 tablet, Rfl: 0    tretinoin (RETIN-A) 0 025 % cream, , Disp: , Rfl:     Review of Systems:  Denies fevers, chills, unintentional weight loss, excessive fatigue, chest pain, shortness of breath, abdominal pain, nausea, vomiting, urinary incontinence, urinary frequency, vaginal bleeding, vaginal discharge  All other systems negative unless otherwise stated  Physical Exam:  /70   Ht 5' 4" (1 626 m)   Wt 63 kg (139 lb)   LMP 10/08/2019   BMI 23 86 kg/m²  Body mass index is 23 86 kg/m²  GEN: The patient was alert and oriented x3, pleasant well-appearing female in no acute distress  HEENT:  Unremarkable, no anterior or posterior lymphadenopathy, no thyromegaly  CV:  Regular rate and rhythm, normal S1 and S2, no murmurs  RESP:  Clear to auscultation bilaterally, no wheezes, rales or rhonchi  BREAST:  Symmetric breasts with no palpable breast masses or obvious breast lesions  She has no retractions or nipple discharge  She has no axillary abnormalities or palpable masses  GI:  Soft, nontender, non-distended  MSK: bilateral lower extremities are nontender, no edema  : Normal appearing external female genitalia, normal appearing urethral meatus  Normal vaginal epithelium  On bimanual exam, no cervical motion tenderness; uterus is smooth, mobile, nontender 6 weeks size  No tenderness or fullness in the bilateral adnexa

## 2019-11-26 DIAGNOSIS — L70.9 ACNE, UNSPECIFIED ACNE TYPE: ICD-10-CM

## 2019-11-26 RX ORDER — SPIRONOLACTONE 50 MG/1
50 TABLET, FILM COATED ORAL DAILY
Qty: 45 TABLET | Refills: 0 | Status: SHIPPED | OUTPATIENT
Start: 2019-11-26 | End: 2019-11-26

## 2019-11-26 RX ORDER — SPIRONOLACTONE 50 MG/1
50 TABLET, FILM COATED ORAL DAILY
Qty: 45 TABLET | Refills: 0 | Status: SHIPPED | OUTPATIENT
Start: 2019-11-26 | End: 2021-04-29 | Stop reason: SDUPTHER

## 2019-11-26 RX ORDER — SPIRONOLACTONE 50 MG/1
50 TABLET, FILM COATED ORAL DAILY
Qty: 45 TABLET | Refills: 0 | Status: CANCELLED | OUTPATIENT
Start: 2019-11-26

## 2020-01-13 DIAGNOSIS — F41.9 ANXIETY: ICD-10-CM

## 2020-01-14 RX ORDER — CITALOPRAM 20 MG/1
20 TABLET ORAL DAILY
Qty: 30 TABLET | Refills: 5 | Status: SHIPPED | OUTPATIENT
Start: 2020-01-14 | End: 2020-07-08 | Stop reason: SDUPTHER

## 2020-03-04 ENCOUNTER — TELEPHONE (OUTPATIENT)
Dept: OBGYN CLINIC | Facility: CLINIC | Age: 25
End: 2020-03-04

## 2020-03-04 NOTE — TELEPHONE ENCOUNTER
Left message on nurse line believes she has two tampons in       [facility] patient states she did have 2 tampons in but was able to get them both out  Call with any problems or concerns

## 2020-03-05 ENCOUNTER — TELEPHONE (OUTPATIENT)
Dept: OBGYN CLINIC | Facility: CLINIC | Age: 25
End: 2020-03-05

## 2020-03-05 DIAGNOSIS — Z30.011 ENCOUNTER FOR INITIAL PRESCRIPTION OF CONTRACEPTIVE PILLS: ICD-10-CM

## 2020-03-05 RX ORDER — NORETHINDRONE ACETATE AND ETHINYL ESTRADIOL 1.5-30(21)
1 KIT ORAL DAILY
Qty: 84 TABLET | Refills: 3 | Status: SHIPPED | OUTPATIENT
Start: 2020-03-05 | End: 2020-03-10 | Stop reason: SDUPTHER

## 2020-03-05 RX ORDER — NORETHINDRONE ACETATE AND ETHINYL ESTRADIOL AND FERROUS FUMARATE 1.5-30(21)
KIT ORAL
Qty: 84 TABLET | Refills: 3 | Status: SHIPPED | OUTPATIENT
Start: 2020-03-05 | End: 2020-03-05 | Stop reason: SDUPTHER

## 2020-03-05 NOTE — TELEPHONE ENCOUNTER
Pt would like to know if you can send one month of her o/c to her local [facility] because she is about to run out  Thank you

## 2020-03-06 ENCOUNTER — TELEPHONE (OUTPATIENT)
Dept: PAIN MEDICINE | Facility: CLINIC | Age: 25
End: 2020-03-06

## 2020-03-06 NOTE — TELEPHONE ENCOUNTER
You sent it to her mail order, she is just requesting a month to go to her local pharmacy because she runs out this weekend  Is that ok?

## 2020-03-10 ENCOUNTER — OFFICE VISIT (OUTPATIENT)
Dept: URGENT CARE | Facility: CLINIC | Age: 25
End: 2020-03-10
Payer: COMMERCIAL

## 2020-03-10 ENCOUNTER — OFFICE VISIT (OUTPATIENT)
Dept: FAMILY MEDICINE CLINIC | Facility: CLINIC | Age: 25
End: 2020-03-10
Payer: COMMERCIAL

## 2020-03-10 VITALS
RESPIRATION RATE: 18 BRPM | DIASTOLIC BLOOD PRESSURE: 76 MMHG | TEMPERATURE: 97.8 F | OXYGEN SATURATION: 100 % | HEIGHT: 64 IN | WEIGHT: 147.4 LBS | BODY MASS INDEX: 25.16 KG/M2 | SYSTOLIC BLOOD PRESSURE: 120 MMHG | HEART RATE: 80 BPM

## 2020-03-10 VITALS
WEIGHT: 147 LBS | TEMPERATURE: 98.2 F | SYSTOLIC BLOOD PRESSURE: 102 MMHG | BODY MASS INDEX: 25.1 KG/M2 | HEIGHT: 64 IN | HEART RATE: 86 BPM | DIASTOLIC BLOOD PRESSURE: 70 MMHG | OXYGEN SATURATION: 99 %

## 2020-03-10 DIAGNOSIS — R51.9 ACUTE NONINTRACTABLE HEADACHE, UNSPECIFIED HEADACHE TYPE: Primary | ICD-10-CM

## 2020-03-10 DIAGNOSIS — Z30.011 ENCOUNTER FOR INITIAL PRESCRIPTION OF CONTRACEPTIVE PILLS: ICD-10-CM

## 2020-03-10 DIAGNOSIS — T19.2XXA FOREIGN BODY IN VAGINA, INITIAL ENCOUNTER: Primary | ICD-10-CM

## 2020-03-10 DIAGNOSIS — R11.0 NAUSEA: ICD-10-CM

## 2020-03-10 LAB
SL AMB  POCT GLUCOSE, UA: NEGATIVE
SL AMB LEUKOCYTE ESTERASE,UA: ABNORMAL
SL AMB POCT BILIRUBIN,UA: NEGATIVE
SL AMB POCT BLOOD,UA: NEGATIVE
SL AMB POCT CLARITY,UA: ABNORMAL
SL AMB POCT COLOR,UA: ABNORMAL
SL AMB POCT KETONES,UA: 40
SL AMB POCT NITRITE,UA: NEGATIVE
SL AMB POCT PH,UA: 6
SL AMB POCT SPECIFIC GRAVITY,UA: 1.02
SL AMB POCT URINE PROTEIN: 30
SL AMB POCT UROBILINOGEN: 0.2

## 2020-03-10 PROCEDURE — 87086 URINE CULTURE/COLONY COUNT: CPT | Performed by: PREVENTIVE MEDICINE

## 2020-03-10 PROCEDURE — 81002 URINALYSIS NONAUTO W/O SCOPE: CPT | Performed by: PREVENTIVE MEDICINE

## 2020-03-10 PROCEDURE — 1036F TOBACCO NON-USER: CPT | Performed by: FAMILY MEDICINE

## 2020-03-10 PROCEDURE — 99213 OFFICE O/P EST LOW 20 MIN: CPT | Performed by: PREVENTIVE MEDICINE

## 2020-03-10 PROCEDURE — 99212 OFFICE O/P EST SF 10 MIN: CPT | Performed by: FAMILY MEDICINE

## 2020-03-10 PROCEDURE — 1036F TOBACCO NON-USER: CPT | Performed by: PREVENTIVE MEDICINE

## 2020-03-10 RX ORDER — CIPROFLOXACIN 500 MG/1
500 TABLET, FILM COATED ORAL EVERY 12 HOURS SCHEDULED
Qty: 6 TABLET | Refills: 0 | Status: SHIPPED | OUTPATIENT
Start: 2020-03-10 | End: 2020-03-13

## 2020-03-10 RX ORDER — NORETHINDRONE ACETATE AND ETHINYL ESTRADIOL 1.5-30(21)
1 KIT ORAL DAILY
Qty: 84 TABLET | Refills: 4 | Status: SHIPPED | OUTPATIENT
Start: 2020-03-10 | End: 2020-08-13 | Stop reason: SDUPTHER

## 2020-03-10 NOTE — PATIENT INSTRUCTIONS
Urinary Tract Infection in Women   AMBULATORY CARE:   A urinary tract infection (UTI)  is caused by bacteria that get inside your urinary tract  Most bacteria that enter your urinary tract come out when you urinate  If the bacteria stay in your urinary tract, you may get an infection  Your urinary tract includes your kidneys, ureters, bladder, and urethra  Urine is made in your kidneys, and it flows from the ureters to the bladder  Urine leaves the bladder through the urethra  A UTI is more common in your lower urinary tract, which includes your bladder and urethra  Common symptoms include the following:   · Urinating more often or waking from sleep to urinate    · Pain or burning when you urinate    · Pain or pressure in your lower abdomen     · Urine that smells bad    · Blood in your urine    · Leaking urine  Seek care immediately if:   · You are urinating very little or not at all  · You have a high fever with shaking chills  · You have side or back pain that gets worse  Contact your healthcare provider if:   · You have a fever  · You do not feel better after 2 days of taking antibiotics  · You are vomiting  · You have questions or concerns about your condition or care  Treatment for a UTI  may include medicines to treat a bacterial infection  You may also need medicines to decrease pain and burning, or decrease the urge to urinate often  Prevent a UTI:   · Empty your bladder often  Urinate and empty your bladder as soon as you feel the need  Do not hold your urine for long periods of time  · Wipe from front to back after you urinate or have a bowel movement  This will help prevent germs from getting into your urinary tract through your urethra  · Drink liquids as directed  Ask how much liquid to drink each day and which liquids are best for you  You may need to drink more liquids than usual to help flush out the bacteria  Do not drink alcohol, caffeine, or citrus juices  These can irritate your bladder and increase your symptoms  Your healthcare provider may recommend cranberry juice to help prevent a UTI  · Urinate after you have sex  This can help flush out bacteria passed during sex  · Do not douche or use feminine deodorants  These can change the chemical balance in your vagina  · Change sanitary pads or tampons often  This will help prevent germs from getting into your urinary tract  · Do pelvic muscle exercises often  Pelvic muscle exercises may help you start and stop urinating  Strong pelvic muscles may help you empty your bladder easier  Squeeze these muscles tightly for 5 seconds like you are trying to hold back urine  Then relax for 5 seconds  Gradually work up to squeezing for 10 seconds  Do 3 sets of 15 repetitions a day, or as directed  Follow up with your healthcare provider as directed:  Write down your questions so you remember to ask them during your visits  © 2017 [address] Information is for End User's use only and may not be sold, redistributed or otherwise used for commercial purposes  All illustrations and images included in CareNotes® are the copyrighted property of [employer]  or Josué German  The above information is an  only  It is not intended as medical advice for individual conditions or treatments  Talk to your doctor, nurse or pharmacist before following any medical regimen to see if it is safe and effective for you

## 2020-03-10 NOTE — LETTER
March 10, 2020     Patient: Eden Aaron   YOB: 1995   Date of Visit: 3/10/2020       To Whom It May Concern: It is my medical opinion that Eden Aaron may return to work on 3/10/2020  If you have any questions or concerns, please don't hesitate to call           Sincerely,        Megan Flores MD    CC: No Recipients

## 2020-03-11 LAB — BACTERIA UR CULT: NORMAL

## 2020-03-12 NOTE — PROGRESS NOTES
[facility]        NAME: Ghulam Saravia is a 25 y o  female  : 1995    MRN: [id number]  DATE: 2020  TIME: 10:18 AM    Assessment and Plan   Acute nonintractable headache, unspecified headache type [R51]  1  Acute nonintractable headache, unspecified headache type  ciprofloxacin (CIPRO) 500 mg tablet   2  Nausea  POCT urine dip    Urine culture    ciprofloxacin (CIPRO) 500 mg tablet         Patient Instructions       Follow up with PCP in 3-5 days  Proceed to  ER if symptoms worsen  Chief Complaint     Chief Complaint   Patient presents with    Nausea     Concerned as she had a forgotten tampon inside her x 8 hours last Tuesday - placed a second inside before realizing 2 hours later - removed both  No vaginal discharge  Today has nausea, occ  vertigo and malaise  No fever, cough, vomiting or diarrhea  No sick contacts  Recent vacation in [place]  - 2020   Fatigue    Headache         History of Present Illness       24 yo F present for concerned as she had a forgotten tampon inside her x 8 hours last Tuesday - placed a second inside before realizing 2 hours later - removed both  No vaginal discharge  Today has nausea, occasional vertigo and malaise  No fever, cough, vomiting or diarrhea  No sick contacts  Recent vacation in [place]  - 2020  Nausea   This is a new problem  The current episode started today  The problem occurs constantly  The problem has been unchanged  Associated symptoms include fatigue, headaches and nausea  Nothing aggravates the symptoms  She has tried nothing for the symptoms  The treatment provided no relief  Fatigue   Associated symptoms include fatigue, headaches and nausea  Headache    Associated symptoms include nausea  Review of Systems   Review of Systems   Constitutional: Positive for fatigue  HENT: Negative  Eyes: Negative  Respiratory: Negative  Cardiovascular: Negative      Gastrointestinal: Positive for nausea  Neurological: Positive for headaches  All other systems reviewed and are negative  Current Medications       Current Outpatient Medications:     citalopram (CeleXA) 20 mg tablet, Take 1 tablet (20 mg total) by mouth daily, Disp: 30 tablet, Rfl: 5    Multiple Vitamin (MULTI-VITAMIN DAILY PO), Take by mouth, Disp: , Rfl:     norethindrone-ethinyl estradiol-iron (Junel FE 1 5/30) 1 5-30 MG-MCG tablet, Take 1 tablet by mouth daily, Disp: 84 tablet, Rfl: 4    spironolactone (ALDACTONE) 50 mg tablet, Take 1 tablet (50 mg total) by mouth daily, Disp: 45 tablet, Rfl: 0    tretinoin (RETIN-A) 0 025 % cream, , Disp: , Rfl:     ciprofloxacin (CIPRO) 500 mg tablet, Take 1 tablet (500 mg total) by mouth every 12 (twelve) hours for 3 days, Disp: 6 tablet, Rfl: 0    Current Allergies     Allergies as of 03/10/2020 - Reviewed 10/31/2019   Allergen Reaction Noted    Sulfa antibiotics Hives and Rash 11/08/2013            The following portions of the patient's history were reviewed and updated as appropriate: allergies, current medications, past family history, past medical history, past social history, past surgical history and problem list      Past Medical History:   Diagnosis Date    Acne     Anxiety     Depression        Past Surgical History:   Procedure Laterality Date    NO PAST SURGERIES         Family History   Problem Relation Age of Onset    Chang's esophagus Mother     Breast cancer Paternal Grandfather          Medications have been verified  Objective   /76   Pulse 80   Temp 97 8 °F (36 6 °C)   Resp 18   Ht 5' 4" (1 626 m)   Wt 66 9 kg (147 lb 6 4 oz)   LMP 03/03/2020 (Exact Date)   SpO2 100%   BMI 25 30 kg/m²        Physical Exam     Physical Exam   Constitutional: She appears well-developed  HENT:   Head: Normocephalic  Eyes: Pupils are equal, round, and reactive to light  EOM are normal    Neck: Normal range of motion  Neck supple     Cardiovascular: Normal rate and regular rhythm  Pulmonary/Chest: Effort normal and breath sounds normal    Abdominal: Soft  Nursing note and vitals reviewed

## 2020-03-15 NOTE — PROGRESS NOTES
Assessment/Plan:    Possible foreign body in vagina  - No evidence of remaining tampon or any other foreign body on exam today  - Patient is currently undergoing treatment for UTI  Continue current antibiotics, and call if symptoms worsen  - Follow up as needed      Subjective:      Patient ID: Ghulam Saravia is a 25 y o  female  HPI  This is a 49-year-old female presenting for evaluation of foreign body in vagina  Patient believes she used a tampon last week and placed another one in without removing the old one  Patient believes she was able to remove both tampons out without leaving anything behind, however would like to be examined to be sure  Patient was diagnosed with a UTI after having dysuria at an urgent care earlier today and will start taking antibiotics  The following portions of the patient's history were reviewed and updated as appropriate: allergies, current medications, past family history, past medical history, past social history, past surgical history and problem list     Review of Systems   Constitutional: Negative for activity change, fatigue and fever  HENT: Negative for congestion, ear pain, sneezing and sore throat  Respiratory: Negative for cough, shortness of breath and wheezing  Cardiovascular: Negative for chest pain  Gastrointestinal: Negative for abdominal pain, constipation, diarrhea, nausea and vomiting  Skin: Negative  Neurological: Negative for dizziness and headaches  Objective:  /70   Pulse 86   Temp 98 2 °F (36 8 °C)   Ht 5' 4" (1 626 m)   Wt 66 7 kg (147 lb)   LMP 03/03/2020 (Exact Date)   SpO2 99%   BMI 25 23 kg/m²      Physical Exam   Constitutional: She is oriented to person, place, and time  She appears well-developed and well-nourished  No distress  HENT:   Head: Normocephalic and atraumatic  Right Ear: External ear normal    Left Ear: External ear normal    Eyes: Conjunctivae are normal  Right eye exhibits no discharge   Left eye exhibits no discharge  No scleral icterus  Cardiovascular: Normal rate, regular rhythm and normal heart sounds  Exam reveals no gallop and no friction rub  No murmur heard  Pulmonary/Chest: Effort normal and breath sounds normal  No respiratory distress  She has no wheezes  She has no rales  Abdominal: Soft  Bowel sounds are normal  She exhibits no distension  There is no tenderness  There is no rebound and no guarding  Genitourinary: Vagina normal  No erythema or tenderness in the vagina  No foreign body in the vagina  No vaginal discharge found  Musculoskeletal: She exhibits no edema or tenderness  Neurological: She is alert and oriented to person, place, and time  Skin: Skin is warm and dry  No rash noted  She is not diaphoretic  No erythema  Psychiatric: She has a normal mood and affect  Her behavior is normal    Vitals reviewed

## 2020-04-27 ENCOUNTER — OFFICE VISIT (OUTPATIENT)
Dept: OBGYN CLINIC | Facility: CLINIC | Age: 25
End: 2020-04-27
Payer: COMMERCIAL

## 2020-04-27 VITALS
DIASTOLIC BLOOD PRESSURE: 64 MMHG | BODY MASS INDEX: 25.95 KG/M2 | WEIGHT: 151.2 LBS | TEMPERATURE: 98.6 F | SYSTOLIC BLOOD PRESSURE: 110 MMHG

## 2020-04-27 DIAGNOSIS — N76.0 BACTERIAL VAGINITIS: Primary | ICD-10-CM

## 2020-04-27 DIAGNOSIS — B96.89 BACTERIAL VAGINITIS: Primary | ICD-10-CM

## 2020-04-27 PROCEDURE — 99214 OFFICE O/P EST MOD 30 MIN: CPT | Performed by: OBSTETRICS & GYNECOLOGY

## 2020-04-27 PROCEDURE — 1036F TOBACCO NON-USER: CPT | Performed by: OBSTETRICS & GYNECOLOGY

## 2020-04-27 RX ORDER — METRONIDAZOLE 500 MG/1
500 TABLET ORAL EVERY 12 HOURS SCHEDULED
Qty: 14 TABLET | Refills: 0 | Status: SHIPPED | OUTPATIENT
Start: 2020-04-27 | End: 2020-05-04

## 2020-05-20 ENCOUNTER — TELEPHONE (OUTPATIENT)
Dept: OBGYN CLINIC | Facility: CLINIC | Age: 25
End: 2020-05-20

## 2020-05-21 ENCOUNTER — OFFICE VISIT (OUTPATIENT)
Dept: OBGYN CLINIC | Facility: CLINIC | Age: 25
End: 2020-05-21
Payer: COMMERCIAL

## 2020-05-21 VITALS
SYSTOLIC BLOOD PRESSURE: 102 MMHG | BODY MASS INDEX: 25.27 KG/M2 | TEMPERATURE: 98.5 F | WEIGHT: 148 LBS | HEIGHT: 64 IN | DIASTOLIC BLOOD PRESSURE: 64 MMHG

## 2020-05-21 DIAGNOSIS — N76.0 RECURRENT VAGINITIS: Primary | ICD-10-CM

## 2020-05-21 PROCEDURE — 1036F TOBACCO NON-USER: CPT | Performed by: OBSTETRICS & GYNECOLOGY

## 2020-05-21 PROCEDURE — 99213 OFFICE O/P EST LOW 20 MIN: CPT | Performed by: OBSTETRICS & GYNECOLOGY

## 2020-05-21 PROCEDURE — 3008F BODY MASS INDEX DOCD: CPT | Performed by: OBSTETRICS & GYNECOLOGY

## 2020-05-29 ENCOUNTER — OFFICE VISIT (OUTPATIENT)
Dept: URGENT CARE | Facility: CLINIC | Age: 25
End: 2020-05-29
Payer: COMMERCIAL

## 2020-05-29 VITALS
HEIGHT: 64 IN | BODY MASS INDEX: 25.44 KG/M2 | DIASTOLIC BLOOD PRESSURE: 90 MMHG | TEMPERATURE: 98.5 F | SYSTOLIC BLOOD PRESSURE: 134 MMHG | OXYGEN SATURATION: 100 % | RESPIRATION RATE: 15 BRPM | HEART RATE: 87 BPM | WEIGHT: 149 LBS

## 2020-05-29 DIAGNOSIS — L23.7 POISON IVY: Primary | ICD-10-CM

## 2020-05-29 PROCEDURE — 3008F BODY MASS INDEX DOCD: CPT | Performed by: NURSE PRACTITIONER

## 2020-05-29 PROCEDURE — 99213 OFFICE O/P EST LOW 20 MIN: CPT | Performed by: NURSE PRACTITIONER

## 2020-05-29 PROCEDURE — 1036F TOBACCO NON-USER: CPT | Performed by: NURSE PRACTITIONER

## 2020-05-29 RX ORDER — CALCIUM ACETATE MONOHYDRATE AND ALUMINUM SULFATE TETRADECAHYDRATE 952; 1347 MG/2299MG; MG/2299MG
1 POWDER, FOR SOLUTION TOPICAL 3 TIMES DAILY
Qty: 15 EACH | Refills: 0 | Status: SHIPPED | OUTPATIENT
Start: 2020-05-29 | End: 2020-11-05

## 2020-05-29 RX ORDER — PREDNISONE 20 MG/1
20 TABLET ORAL 2 TIMES DAILY WITH MEALS
Qty: 14 TABLET | Refills: 0 | Status: SHIPPED | OUTPATIENT
Start: 2020-05-29 | End: 2020-06-05

## 2020-05-29 RX ORDER — PREDNISONE 20 MG/1
20 TABLET ORAL 2 TIMES DAILY WITH MEALS
Qty: 14 TABLET | Refills: 0 | Status: SHIPPED | OUTPATIENT
Start: 2020-05-29 | End: 2020-05-29 | Stop reason: CLARIF

## 2020-06-01 LAB
A VAGINAE DNA VAG NAA+PROBE-LOG#: NOT DETECTED LOG CELLS/ML
C GLABRATA DNA VAG QL NAA+PROBE: NOT DETECTED
C TRACH RRNA SPEC QL NAA+PROBE: NOT DETECTED
CANDIDA DNA VAG QL NAA+PROBE: NOT DETECTED
G VAGINALIS DNA VAG NAA+PROBE-LOG#: 4.7 LOG (CELLS/ML)
LACTOBACILLUS DNA VAG NAA+PROBE-LOG#: >8 LOG (CELLS/ML)
MEGASPHAERA SP DNA VAG NAA+PROBE-LOG#: NOT DETECTED LOG CELLS/ML
N GONORRHOEA RRNA SPEC QL NAA+PROBE: NOT DETECTED
SL AMB BV CATEGORY:: NORMAL
SL AMB C. PARAPSILOSIS, DNA: NOT DETECTED
SL AMB C. TROPICALIS, DNA: NOT DETECTED
T VAGINALIS RRNA SPEC QL NAA+PROBE: NOT DETECTED

## 2020-06-10 ENCOUNTER — OFFICE VISIT (OUTPATIENT)
Dept: URGENT CARE | Facility: CLINIC | Age: 25
End: 2020-06-10
Payer: COMMERCIAL

## 2020-06-10 VITALS
WEIGHT: 145 LBS | TEMPERATURE: 98.6 F | HEIGHT: 64 IN | BODY MASS INDEX: 24.75 KG/M2 | HEART RATE: 83 BPM | RESPIRATION RATE: 16 BRPM | SYSTOLIC BLOOD PRESSURE: 147 MMHG | OXYGEN SATURATION: 98 % | DIASTOLIC BLOOD PRESSURE: 66 MMHG

## 2020-06-10 DIAGNOSIS — L50.9 HIVES: Primary | ICD-10-CM

## 2020-06-10 PROCEDURE — 3008F BODY MASS INDEX DOCD: CPT | Performed by: PHYSICIAN ASSISTANT

## 2020-06-10 PROCEDURE — 1036F TOBACCO NON-USER: CPT | Performed by: PHYSICIAN ASSISTANT

## 2020-06-10 PROCEDURE — 99212 OFFICE O/P EST SF 10 MIN: CPT | Performed by: PHYSICIAN ASSISTANT

## 2020-06-10 RX ORDER — METHYLPREDNISOLONE 4 MG/1
TABLET ORAL
Qty: 1 EACH | Refills: 0 | Status: SHIPPED | OUTPATIENT
Start: 2020-06-10 | End: 2020-11-05

## 2020-07-08 DIAGNOSIS — F41.9 ANXIETY: ICD-10-CM

## 2020-07-08 RX ORDER — CITALOPRAM 20 MG/1
20 TABLET ORAL DAILY
Qty: 90 TABLET | Refills: 3 | Status: SHIPPED | OUTPATIENT
Start: 2020-07-08 | End: 2020-08-27

## 2020-07-11 ENCOUNTER — TRANSCRIBE ORDERS (OUTPATIENT)
Dept: ADMINISTRATIVE | Facility: HOSPITAL | Age: 25
End: 2020-07-11

## 2020-07-11 ENCOUNTER — APPOINTMENT (OUTPATIENT)
Dept: LAB | Facility: HOSPITAL | Age: 25
End: 2020-07-11
Payer: COMMERCIAL

## 2020-07-11 DIAGNOSIS — Z79.899 ENCOUNTER FOR LONG-TERM (CURRENT) USE OF OTHER MEDICATIONS: ICD-10-CM

## 2020-07-11 DIAGNOSIS — Z79.899 ENCOUNTER FOR LONG-TERM (CURRENT) USE OF OTHER MEDICATIONS: Primary | ICD-10-CM

## 2020-07-11 LAB
ALBUMIN SERPL BCP-MCNC: 3.5 G/DL (ref 3.5–5)
ALP SERPL-CCNC: 32 U/L (ref 46–116)
ALT SERPL W P-5'-P-CCNC: 56 U/L (ref 12–78)
ANION GAP SERPL CALCULATED.3IONS-SCNC: 7 MMOL/L (ref 4–13)
AST SERPL W P-5'-P-CCNC: 32 U/L (ref 5–45)
BILIRUB SERPL-MCNC: 0.4 MG/DL (ref 0.2–1)
BUN SERPL-MCNC: 11 MG/DL (ref 5–25)
CALCIUM SERPL-MCNC: 8.1 MG/DL (ref 8.3–10.1)
CHLORIDE SERPL-SCNC: 104 MMOL/L (ref 100–108)
CO2 SERPL-SCNC: 26 MMOL/L (ref 21–32)
CREAT SERPL-MCNC: 0.7 MG/DL (ref 0.6–1.3)
GFR SERPL CREATININE-BSD FRML MDRD: 121 ML/MIN/1.73SQ M
GLUCOSE P FAST SERPL-MCNC: 84 MG/DL (ref 65–99)
POTASSIUM SERPL-SCNC: 3.9 MMOL/L (ref 3.5–5.3)
PROT SERPL-MCNC: 6.7 G/DL (ref 6.4–8.2)
SODIUM SERPL-SCNC: 137 MMOL/L (ref 136–145)

## 2020-07-11 PROCEDURE — 36415 COLL VENOUS BLD VENIPUNCTURE: CPT

## 2020-07-11 PROCEDURE — 80053 COMPREHEN METABOLIC PANEL: CPT

## 2020-08-13 DIAGNOSIS — Z30.011 ENCOUNTER FOR INITIAL PRESCRIPTION OF CONTRACEPTIVE PILLS: ICD-10-CM

## 2020-08-13 RX ORDER — NORETHINDRONE ACETATE AND ETHINYL ESTRADIOL 1.5-30(21)
1 KIT ORAL DAILY
Qty: 84 TABLET | Refills: 0 | Status: SHIPPED | OUTPATIENT
Start: 2020-08-13 | End: 2020-08-13 | Stop reason: SDUPTHER

## 2020-08-13 RX ORDER — NORETHINDRONE ACETATE AND ETHINYL ESTRADIOL 1.5-30(21)
1 KIT ORAL DAILY
Qty: 84 TABLET | Refills: 0 | Status: SHIPPED | OUTPATIENT
Start: 2020-08-13 | End: 2020-10-22 | Stop reason: SDUPTHER

## 2020-08-13 NOTE — TELEPHONE ENCOUNTER
Calling for refill on OCP  Appt given for annual for 11/5/2020  Pharmacy updated  Routing to provider to order

## 2020-08-27 DIAGNOSIS — F32.A ANXIETY AND DEPRESSION: Primary | ICD-10-CM

## 2020-08-27 DIAGNOSIS — F41.9 ANXIETY AND DEPRESSION: Primary | ICD-10-CM

## 2020-08-27 RX ORDER — VENLAFAXINE HYDROCHLORIDE 37.5 MG/1
37.5 CAPSULE, EXTENDED RELEASE ORAL
Qty: 90 CAPSULE | Refills: 3 | Status: SHIPPED | OUTPATIENT
Start: 2020-08-27 | End: 2020-09-02 | Stop reason: SDUPTHER

## 2020-09-01 ENCOUNTER — PATIENT MESSAGE (OUTPATIENT)
Dept: FAMILY MEDICINE CLINIC | Facility: CLINIC | Age: 25
End: 2020-09-01

## 2020-09-01 DIAGNOSIS — F32.A ANXIETY AND DEPRESSION: ICD-10-CM

## 2020-09-01 DIAGNOSIS — F41.9 ANXIETY AND DEPRESSION: ICD-10-CM

## 2020-09-02 RX ORDER — VENLAFAXINE HYDROCHLORIDE 37.5 MG/1
37.5 CAPSULE, EXTENDED RELEASE ORAL
Qty: 90 CAPSULE | Refills: 3 | Status: SHIPPED | OUTPATIENT
Start: 2020-09-02 | End: 2020-11-05

## 2020-09-02 NOTE — TELEPHONE ENCOUNTER
Spoke with patient withdrawal syndrome is possible after abrupt discontinuation of antidepressant  Patient feels slightly better today compared to yesterday, and received an e-mail stating her new medication should be delivered today via [facility]  Will send another prescription to [facility] in case the delivery is delayed again, and patient understands to start the new medication today

## 2020-10-06 ENCOUNTER — TELEPHONE (OUTPATIENT)
Dept: OBGYN CLINIC | Facility: CLINIC | Age: 25
End: 2020-10-06

## 2020-10-21 DIAGNOSIS — Z30.011 ENCOUNTER FOR INITIAL PRESCRIPTION OF CONTRACEPTIVE PILLS: ICD-10-CM

## 2020-10-22 RX ORDER — NORETHINDRONE ACETATE AND ETHINYL ESTRADIOL 1.5-30(21)
1 KIT ORAL DAILY
Qty: 84 TABLET | Refills: 0 | Status: SHIPPED | OUTPATIENT
Start: 2020-10-22 | End: 2022-02-17 | Stop reason: SDUPTHER

## 2020-11-05 ENCOUNTER — ANNUAL EXAM (OUTPATIENT)
Dept: OBGYN CLINIC | Facility: CLINIC | Age: 25
End: 2020-11-05
Payer: COMMERCIAL

## 2020-11-05 VITALS
DIASTOLIC BLOOD PRESSURE: 60 MMHG | HEIGHT: 64 IN | TEMPERATURE: 98.6 F | BODY MASS INDEX: 25.27 KG/M2 | SYSTOLIC BLOOD PRESSURE: 102 MMHG | WEIGHT: 148 LBS

## 2020-11-05 DIAGNOSIS — Z01.419 WELL WOMAN EXAM WITH ROUTINE GYNECOLOGICAL EXAM: Primary | ICD-10-CM

## 2020-11-05 PROCEDURE — 1036F TOBACCO NON-USER: CPT | Performed by: OBSTETRICS & GYNECOLOGY

## 2020-11-05 PROCEDURE — 3008F BODY MASS INDEX DOCD: CPT | Performed by: FAMILY MEDICINE

## 2020-11-05 PROCEDURE — 99395 PREV VISIT EST AGE 18-39: CPT | Performed by: OBSTETRICS & GYNECOLOGY

## 2020-11-05 RX ORDER — CITALOPRAM 40 MG/1
40 TABLET ORAL DAILY
COMMUNITY
End: 2020-12-02 | Stop reason: SDUPTHER

## 2020-11-18 ENCOUNTER — TELEMEDICINE (OUTPATIENT)
Dept: FAMILY MEDICINE CLINIC | Facility: CLINIC | Age: 25
End: 2020-11-18
Payer: COMMERCIAL

## 2020-11-18 DIAGNOSIS — Z20.822 EXPOSURE TO COVID-19 VIRUS: ICD-10-CM

## 2020-11-18 DIAGNOSIS — B34.9 VIRAL INFECTION, UNSPECIFIED: ICD-10-CM

## 2020-11-18 PROCEDURE — U0003 INFECTIOUS AGENT DETECTION BY NUCLEIC ACID (DNA OR RNA); SEVERE ACUTE RESPIRATORY SYNDROME CORONAVIRUS 2 (SARS-COV-2) (CORONAVIRUS DISEASE [COVID-19]), AMPLIFIED PROBE TECHNIQUE, MAKING USE OF HIGH THROUGHPUT TECHNOLOGIES AS DESCRIBED BY CMS-2020-01-R: HCPCS | Performed by: STUDENT IN AN ORGANIZED HEALTH CARE EDUCATION/TRAINING PROGRAM

## 2020-11-18 PROCEDURE — 1036F TOBACCO NON-USER: CPT | Performed by: FAMILY MEDICINE

## 2020-11-18 PROCEDURE — 99213 OFFICE O/P EST LOW 20 MIN: CPT | Performed by: FAMILY MEDICINE

## 2020-11-20 ENCOUNTER — TELEPHONE (OUTPATIENT)
Dept: FAMILY MEDICINE CLINIC | Facility: CLINIC | Age: 25
End: 2020-11-20

## 2020-11-20 LAB — SARS-COV-2 RNA SPEC QL NAA+PROBE: NOT DETECTED

## 2020-12-01 ENCOUNTER — PATIENT MESSAGE (OUTPATIENT)
Dept: FAMILY MEDICINE CLINIC | Facility: CLINIC | Age: 25
End: 2020-12-01

## 2020-12-01 ENCOUNTER — OFFICE VISIT (OUTPATIENT)
Dept: PODIATRY | Facility: CLINIC | Age: 25
End: 2020-12-01
Payer: COMMERCIAL

## 2020-12-01 VITALS — WEIGHT: 148 LBS | HEIGHT: 64 IN | BODY MASS INDEX: 25.27 KG/M2 | RESPIRATION RATE: 16 BRPM

## 2020-12-01 DIAGNOSIS — M20.41 HAMMER TOE OF RIGHT FOOT: ICD-10-CM

## 2020-12-01 DIAGNOSIS — B07.0 PLANTAR WARTS: ICD-10-CM

## 2020-12-01 DIAGNOSIS — M89.8X9 BONY EXOSTOSIS: ICD-10-CM

## 2020-12-01 DIAGNOSIS — M21.961 ACQUIRED DEFORMITY OF RIGHT FOOT: Primary | ICD-10-CM

## 2020-12-01 PROCEDURE — 73620 X-RAY EXAM OF FOOT: CPT | Performed by: PODIATRIST

## 2020-12-01 PROCEDURE — 99203 OFFICE O/P NEW LOW 30 MIN: CPT | Performed by: PODIATRIST

## 2020-12-01 PROCEDURE — 17110 DESTRUCTION B9 LES UP TO 14: CPT | Performed by: PODIATRIST

## 2020-12-02 DIAGNOSIS — F32.A ANXIETY AND DEPRESSION: Primary | ICD-10-CM

## 2020-12-02 DIAGNOSIS — F41.9 ANXIETY AND DEPRESSION: Primary | ICD-10-CM

## 2020-12-02 RX ORDER — CITALOPRAM 40 MG/1
40 TABLET ORAL DAILY
Qty: 90 TABLET | Refills: 3 | Status: SHIPPED | OUTPATIENT
Start: 2020-12-02 | End: 2021-02-24 | Stop reason: SDUPTHER

## 2020-12-15 ENCOUNTER — OFFICE VISIT (OUTPATIENT)
Dept: PODIATRY | Facility: CLINIC | Age: 25
End: 2020-12-15
Payer: COMMERCIAL

## 2020-12-15 VITALS — WEIGHT: 148 LBS | HEIGHT: 64 IN | RESPIRATION RATE: 16 BRPM | BODY MASS INDEX: 25.27 KG/M2

## 2020-12-15 DIAGNOSIS — S91.109A OPEN WOUND OF TOE OF RIGHT FOOT: Primary | ICD-10-CM

## 2020-12-15 PROCEDURE — 99212 OFFICE O/P EST SF 10 MIN: CPT | Performed by: PODIATRIST

## 2021-01-02 ENCOUNTER — OFFICE VISIT (OUTPATIENT)
Dept: URGENT CARE | Facility: CLINIC | Age: 26
End: 2021-01-02
Payer: COMMERCIAL

## 2021-01-02 VITALS
HEART RATE: 83 BPM | WEIGHT: 152 LBS | BODY MASS INDEX: 25.95 KG/M2 | RESPIRATION RATE: 18 BRPM | TEMPERATURE: 98.3 F | DIASTOLIC BLOOD PRESSURE: 74 MMHG | OXYGEN SATURATION: 99 % | SYSTOLIC BLOOD PRESSURE: 122 MMHG | HEIGHT: 64 IN

## 2021-01-02 DIAGNOSIS — L23.9 ALLERGIC CONTACT DERMATITIS, UNSPECIFIED TRIGGER: Primary | ICD-10-CM

## 2021-01-02 PROCEDURE — 3725F SCREEN DEPRESSION PERFORMED: CPT | Performed by: PHYSICIAN ASSISTANT

## 2021-01-02 PROCEDURE — 99213 OFFICE O/P EST LOW 20 MIN: CPT | Performed by: PHYSICIAN ASSISTANT

## 2021-01-02 PROCEDURE — 3008F BODY MASS INDEX DOCD: CPT | Performed by: PHYSICIAN ASSISTANT

## 2021-01-02 PROCEDURE — 1036F TOBACCO NON-USER: CPT | Performed by: PHYSICIAN ASSISTANT

## 2021-01-02 RX ORDER — CLOBETASOL PROPIONATE 0.5 MG/G
CREAM TOPICAL 2 TIMES DAILY
Qty: 30 G | Refills: 0 | Status: SHIPPED | OUTPATIENT
Start: 2021-01-02

## 2021-01-02 NOTE — PATIENT INSTRUCTIONS
Poison Ivy   WHAT YOU NEED TO KNOW:   Poison ivy is a plant that can cause an itchy, uncomfortable rash on your skin  Poison ivy grows as a shrub or vine in woods, fields, and areas of thick [place]  It has 3 bright green leaves on each stem that turn red in shanae  DISCHARGE INSTRUCTIONS:   Medicines:   · Antiseptic or drying creams or ointments: These medicines may be used to dry out the rash and decrease the itching  These products may be available without a doctor's order  · Steroids: This medicine helps decrease itching and inflammation  It can be given as a cream to apply to your skin or as a pill  · Antihistamines: This medicine may help decrease itching and help you sleep  It is available without a doctor's order  · Take your medicine as directed  Contact your healthcare provider if you think your medicine is not helping or if you have side effects  Tell him of her if you are allergic to any medicine  Keep a list of the medicines, vitamins, and herbs you take  Include the amounts, and when and why you take them  Bring the list or the pill bottles to follow-up visits  Carry your medicine list with you in case of an emergency  Follow up with your healthcare provider as directed:  Write down your questions so you remember to ask them during your visits  How your poison ivy rash spreads: You cannot spread poison ivy by touching your rash or the liquid from your blisters  Poison ivy is spread only if you scratch your skin while it still has oil on it  You may think your rash is spreading because new rashes appear over a number of days  This happens because areas covered by thin skin break out in a rash first  Your face or forearms may develop a rash before thicker areas, such as the palms of your hands  Self-care:   · Keep your rash clean and dry:  Wash it with soap and water  Gently pat it dry with a clean towel  · Try not to scratch or rub your rash:   This can cause your skin to become infected  · Use a compress on your rash:  Dip a clean washcloth in cool water  Wring it out and place it on your rash  Leave the washcloth on your skin for 15 minutes  Do this at least 3 times per day  · Take a cornstarch or oatmeal bath: If your rash is too large to cover with wet washcloths, take 3 or 4 cornstarch baths daily  Mix 1 pound of cornstarch with a little water to make a paste  Add the paste to a tub full of water and mix well  You may also use colloidal oatmeal in the bath water  Use lukewarm water  Avoid hot water because it may cause your itching to increase  Prevent a poison ivy rash in the future:   · Wear skin protection:  Wear long pants, a long-sleeved shirt, and gloves  Use a skin block lotion to protect your skin from poison ivy oil  You can find this at a drugstore without a prescription  · Wash clothing after possible exposure: If you think you have been near a poison ivy plant, wash the clothes you were wearing separately from other clothes  Rinse the washing machine well after you take the clothes out  Scrub boots and shoes with warm, soapy water  Dry clean items and clothing that you cannot wash in water  Poison ivy oil is sticky and can stay on surfaces for a long time  It can cause a new rash even years later  · Bathe your pet:  Use warm water and shampoo on your pet's fur  This will prevent the spread of oil to your skin, car, and home  Wear long sleeves, long pants, and gloves while washing pets or any items that may have oil on them  · Reduce exposure to poison ivy:  Do not touch plants that look like poison ivy  Keep your yard free of poison ivy  While protecting your skin, remove the plant and the roots  Place them in a plastic bag and seal the bag tightly  · Do not burn poison ivy plants: This can spread the oil through the air  If you breathe the oil into your lungs, you could have swelling and serious breathing problems   Oil that clings to the fire whit can land on your skin and cause a rash  Contact your healthcare provider if:   · You have pus, soft yellow scabs, or tenderness on the rash  · The itching gets worse or keeps you awake at night  · The rash covers more than 1/4 of your skin or spreads to your eyes, mouth, or genital area  · The rash is not better after 2 to 3 weeks  · You have tender, swollen glands on the sides of your neck  · You have swelling in your arms and legs  · You have questions or concerns about your condition or care  Return to the emergency department if:   · You have a fever  · You have redness, swelling, and tenderness around the rash  · You have trouble breathing  © Copyright [address] Information is for End User's use only and may not be sold, redistributed or otherwise used for commercial purposes  All illustrations and images included in CareNotes® are the copyrighted property of [employer]  or [employer]   The above information is an  only  It is not intended as medical advice for individual conditions or treatments  Talk to your doctor, nurse or pharmacist before following any medical regimen to see if it is safe and effective for you  Poison Ivy Dermatitis:   -The patients hx and physical exam are consistent with poison ivy dermatitis or contact dermatitis  Will prescribe clobetasol to be applied to the area twice daily as prescribed  The rash is confined to the chest area right now  If the rash spreads or worsens despite the use of the topical steroid follow up immediately for recheck    -Stay well hydrated   You can take benadryl as directed for the itching    -You may also use calamine lotion or take oatmeal baths    -If your symptoms worsen or persist follow up immediately with your PCP

## 2021-01-02 NOTE — PROGRESS NOTES
[address] [facility]        NAME: Arvind Sanchez is a 22 y o  female  : 1995    MRN: [id number]  DATE: 2021  TIME: 10:04 AM    Assessment and Plan   Allergic contact dermatitis, unspecified trigger [L23 9]  1  Allergic contact dermatitis, unspecified trigger  clobetasol (TEMOVATE) 0 05 % cream         Patient Instructions     Poison Ivy Dermatitis:   -The patients hx and physical exam are consistent with poison ivy dermatitis or contact dermatitis  Will prescribe clobetasol to be applied to the area twice daily as prescribed  The rash is confined to the chest area right now  If the rash spreads or worsens despite the use of the topical steroid follow up immediately for recheck    -Stay well hydrated  You can take benadryl as directed for the itching    -You may also use calamine lotion or take oatmeal baths    -If your symptoms worsen or persist follow up immediately with your PCP    Follow up with PCP in 3-5 days  Proceed to  ER if symptoms worsen  Chief Complaint     Chief Complaint   Patient presents with    Rash     Pt reports of left chest rash with some located on the neck  S/S started last week  History of Present Illness       The patient is a 66-year-old female who presents today for a rash of the left chest wall x 1 week  The patient states that one week ago she began to experience a small area of erythema and maculopapular rash on her left anterior chest wall and breast that progressed to the left side of her neck  The lesions then became vesicular  She states that the rash is pruritic but denies pain  She states that she has been outdoors but does not know if she came into contact with poison ivy or poison oak  She has been putting hydrocortisone on the rash with minimal relief         Review of Systems   Review of Systems      Current Medications       Current Outpatient Medications:     citalopram (CeleXA) 40 mg tablet, Take 1 tablet (40 mg total) by mouth daily, Disp: 90 tablet, Rfl: 3    clobetasol (TEMOVATE) 0 05 % cream, Apply topically 2 (two) times a day, Disp: 30 g, Rfl: 0    Multiple Vitamin (MULTI-VITAMIN DAILY PO), Take by mouth, Disp: , Rfl:     norethindrone-ethinyl estradiol-iron (Junel FE 1 5/30) 1 5-30 MG-MCG tablet, Take 1 tablet by mouth daily, Disp: 84 tablet, Rfl: 0    spironolactone (ALDACTONE) 50 mg tablet, Take 1 tablet (50 mg total) by mouth daily, Disp: 45 tablet, Rfl: 0    tretinoin (RETIN-A) 0 025 % cream, , Disp: , Rfl:     Current Allergies     Allergies as of 01/02/2021 - Reviewed 12/15/2020   Allergen Reaction Noted    Sulfa antibiotics Hives and Rash 11/08/2013            The following portions of the patient's history were reviewed and updated as appropriate: allergies, current medications, past family history, past medical history, past social history, past surgical history and problem list      Past Medical History:   Diagnosis Date    Acne     Anxiety     Depression        Past Surgical History:   Procedure Laterality Date    NO PAST SURGERIES         Family History   Problem Relation Age of Onset    Chang's esophagus Mother     Breast cancer Paternal Grandfather     No Known Problems Father          Medications have been verified  Objective   /74   Pulse 83   Temp 98 3 °F (36 8 °C)   Resp 18   Ht 5' 4" (1 626 m)   Wt 68 9 kg (152 lb)   SpO2 99%   BMI 26 09 kg/m²   No LMP recorded  Physical Exam     Physical Exam  Vitals signs and nursing note reviewed  Constitutional:       General: She is not in acute distress  Appearance: She is well-developed  She is not diaphoretic  HENT:      Mouth/Throat:      Pharynx: Uvula midline  Cardiovascular:      Rate and Rhythm: Normal rate and regular rhythm  Pulses: Normal pulses  Heart sounds: Normal heart sounds, S1 normal and S2 normal  No murmur     Pulmonary:      Effort: Pulmonary effort is normal  No tachypnea, accessory muscle usage or respiratory distress  Breath sounds: Normal breath sounds  No stridor  No decreased breath sounds, wheezing, rhonchi or rales  Skin:     Capillary Refill: Capillary refill takes less than 2 seconds  Findings: Rash present  Rash is vesicular (There is an erythematous, vesicular, maculopapular rash over the left anterior chest wall above the left breast with 1-2 satellite lesions over the left neck area  )

## 2021-02-23 ENCOUNTER — OFFICE VISIT (OUTPATIENT)
Dept: PODIATRY | Facility: CLINIC | Age: 26
End: 2021-02-23
Payer: COMMERCIAL

## 2021-02-23 VITALS
SYSTOLIC BLOOD PRESSURE: 122 MMHG | HEART RATE: 83 BPM | DIASTOLIC BLOOD PRESSURE: 74 MMHG | RESPIRATION RATE: 16 BRPM | BODY MASS INDEX: 25.95 KG/M2 | HEIGHT: 64 IN | WEIGHT: 152 LBS

## 2021-02-23 DIAGNOSIS — M79.671 RIGHT FOOT PAIN: ICD-10-CM

## 2021-02-23 DIAGNOSIS — M20.41 HAMMER TOE OF RIGHT FOOT: ICD-10-CM

## 2021-02-23 DIAGNOSIS — B07.0 PLANTAR WARTS: Primary | ICD-10-CM

## 2021-02-23 PROCEDURE — 99212 OFFICE O/P EST SF 10 MIN: CPT | Performed by: PODIATRIST

## 2021-02-23 PROCEDURE — 17110 DESTRUCTION B9 LES UP TO 14: CPT | Performed by: PODIATRIST

## 2021-02-23 NOTE — PROGRESS NOTES
Assessment/Plan: verruca 5th  Right toe  Hammertoe 5th right  Plan  Patient advised on condition  Lesion debrided  Cantharone applied  Patient return for follow-up         Diagnoses and all orders for this visit:    Plantar warts    Right foot pain          Subjective:   Patient has return of pain in the small toe of the right foot  This has been ongoing for weeks  No history of trauma    Allergies   Allergen Reactions    Sulfa Antibiotics Hives and Rash         Current Outpatient Medications:     citalopram (CeleXA) 40 mg tablet, Take 1 tablet (40 mg total) by mouth daily, Disp: 90 tablet, Rfl: 3    clobetasol (TEMOVATE) 0 05 % cream, Apply topically 2 (two) times a day, Disp: 30 g, Rfl: 0    Multiple Vitamin (MULTI-VITAMIN DAILY PO), Take by mouth, Disp: , Rfl:     norethindrone-ethinyl estradiol-iron (Junel FE 1 5/30) 1 5-30 MG-MCG tablet, Take 1 tablet by mouth daily, Disp: 84 tablet, Rfl: 0    spironolactone (ALDACTONE) 50 mg tablet, Take 1 tablet (50 mg total) by mouth daily, Disp: 45 tablet, Rfl: 0    tretinoin (RETIN-A) 0 025 % cream, , Disp: , Rfl:     Patient Active Problem List   Diagnosis    Lymphadenopathy of right cervical region    Anxiety and depression          Patient ID: Sadiq Romero is a 22 y o  female  HPI    The following portions of the patient's history were reviewed and updated as appropriate:     family history includes Chang's esophagus in her mother; Breast cancer in her paternal grandfather; No Known Problems in her father  reports that she has never smoked  She has never used smokeless tobacco  She reports current alcohol use of about 3 0 standard drinks of alcohol per week  She reports that she does not use drugs  Vitals:    02/23/21 1654   BP: 122/74   Pulse: 83   Resp: 16       Review of Systems      Objective:  Patient's shoes and socks removed     Foot ExamPhysical Exam            General  General Appearance: appears stated age and healthy   Orientation: alert and oriented to person, place, and time   Affect: appropriate   Gait: unimpaired         Right Foot/Ankle      Inspection and Palpation  Ecchymosis: none  Tenderness: bony tenderness   Swelling: dorsum   Arch: pes planus  Hammertoes: fifth toe  Hallux valgus: no  Hallux limitus: yes  Skin Exam: skin changes and warts;      Neurovascular  Dorsalis pedis: 3+  Posterior tibial: 3+  Saphenous nerve sensation: normal  Tibial nerve sensation: normal  Superficial peroneal nerve sensation: normal  Deep peroneal nerve sensation: normal  Sural nerve sensation: normal        Left Foot/Ankle       Inspection and Palpation  Ecchymosis: none  Swelling: dorsum   Arch: pes planus  Hallux valgus: no  Hallux limitus: yes  Skin Exam: skin changes;      Neurovascular  Dorsalis pedis: 3+  Posterior tibial: 3+  Saphenous nerve sensation: normal  Tibial nerve sensation: normal  Superficial peroneal nerve sensation: normal  Deep peroneal nerve sensation: normal  Sural nerve sensation: normal           Physical Exam  Vitals signs and nursing note reviewed  Constitutional:       Appearance: Normal appearance  Cardiovascular:      Rate and Rhythm: Normal rate and regular rhythm       Pulses:           Dorsalis pedis pulses are 3+ on the right side and 3+ on the left side         Posterior tibial pulses are 3+ on the right side and 3+ on the left side  Musculoskeletal:      Right foot: Bony tenderness present   No bunion       Left foot: No bunion       Comments:  5th right toe is rotated   It is in varus position   Weight-bearing appears to be on the lateral aspect of the toe     [place] is no evidence of fracture   There is enlarged lateral tubercle of the distal phalanx of the 5th right toe   Skin:     Capillary Refill: Capillary refill takes less than 2 seconds       Comments:   Lateral aspect 5th right toe demonstrates punctate lesion consistent with atypical verruca    Lesions now contained within a bullae  Bullae debrided  No remaining apparent atypical verruca  Only superficial wound  No evidence of cellulitis        Neurological:      Mental Status: She is alert  Psychiatric:         Mood and Affect: Mood normal          BehaviorLawrance Betsy         Thought Content:  Thought content normal          Judgment: Judgment normal

## 2021-02-24 DIAGNOSIS — F32.A ANXIETY AND DEPRESSION: ICD-10-CM

## 2021-02-24 DIAGNOSIS — F41.9 ANXIETY AND DEPRESSION: ICD-10-CM

## 2021-02-24 RX ORDER — CITALOPRAM 40 MG/1
40 TABLET ORAL DAILY
Qty: 90 TABLET | Refills: 3 | Status: SHIPPED | OUTPATIENT
Start: 2021-02-24 | End: 2021-03-15 | Stop reason: SDUPTHER

## 2021-03-04 ENCOUNTER — PROCEDURE VISIT (OUTPATIENT)
Dept: OBGYN CLINIC | Facility: CLINIC | Age: 26
End: 2021-03-04
Payer: COMMERCIAL

## 2021-03-04 VITALS
HEIGHT: 64 IN | WEIGHT: 149.6 LBS | DIASTOLIC BLOOD PRESSURE: 64 MMHG | BODY MASS INDEX: 25.54 KG/M2 | SYSTOLIC BLOOD PRESSURE: 118 MMHG

## 2021-03-04 DIAGNOSIS — Z30.017 NEXPLANON INSERTION: Primary | ICD-10-CM

## 2021-03-04 PROCEDURE — 11981 INSERTION DRUG DLVR IMPLANT: CPT | Performed by: OBSTETRICS & GYNECOLOGY

## 2021-03-04 PROCEDURE — 3008F BODY MASS INDEX DOCD: CPT | Performed by: PHYSICIAN ASSISTANT

## 2021-03-04 NOTE — PROGRESS NOTES
Universal Protocol:  Consent: Verbal consent obtained  Written consent obtained  Consent given by: patient  Patient understanding: patient states understanding of the procedure being performed  Patient consent: the patient's understanding of the procedure matches consent given  Procedure consent: procedure consent matches procedure scheduled    Remove and insert drug implant    Date/Time: 3/4/2021 5:54 PM  Performed by: Mikaela Mejia DO  Authorized by: Mikaela Mejia DO     Indication:     Indication: Insertion of non-biodegradable drug delivery implant    Pre-procedure:     Prepped with: povidone-iodine      Local anesthetic:  Lidocaine 1%    The site was cleaned and prepped in a sterile fashion: yes    Procedure:     Procedure: Insertion    Left/right:  Left    Preloaded contraceptive capsule trocar was placed subdermally: yes      Visualization of implant was obtained: yes      Contraceptive capsule was inserted and trocar removed: yes      Palpation confirms placement by provider and patient: yes      Site was closed with steri-strips and pressure bandage applied: yes    Comments:      Consent was obtained and time out was performed  Pregnancy was excluded by currently on her period and [facility] use  The patient was placed in the supine position with the left inner arm exposed  The area for placement of the device was identified and marked approximately 8 cm from the medial epicondyle between the biceps and triceps muscles  The patient was prepped in the usual fashion using betadine swabs x 3  Local anesthesia was applied to the insertion site with approximately 3cc of 1% lidocaine  The Nexplanon was verified inside the trocar and then introduced into the skin  The needle was advanced to its full length while tenting the skin  The Nexplanon was then inserted without difficulty and the needle removed from the patient's arm  The insertion site was palpated by myself to assure proper placement   There was no significant bleeding noted  A pressure dressing was applied  The patient tolerated the procedure well

## 2021-03-10 ENCOUNTER — TELEPHONE (OUTPATIENT)
Dept: OTHER | Facility: OTHER | Age: 26
End: 2021-03-10

## 2021-03-10 NOTE — TELEPHONE ENCOUNTER
Pt called the COVID-19 Hotline and was placed in the [facility] for a return call

## 2021-03-15 ENCOUNTER — TELEPHONE (OUTPATIENT)
Dept: FAMILY MEDICINE CLINIC | Facility: CLINIC | Age: 26
End: 2021-03-15

## 2021-03-15 DIAGNOSIS — F32.A ANXIETY AND DEPRESSION: ICD-10-CM

## 2021-03-15 DIAGNOSIS — F41.9 ANXIETY AND DEPRESSION: ICD-10-CM

## 2021-03-15 RX ORDER — CITALOPRAM 40 MG/1
40 TABLET ORAL DAILY
Qty: 90 TABLET | Refills: 3 | Status: SHIPPED | OUTPATIENT
Start: 2021-03-15 | End: 2021-04-20 | Stop reason: SDUPTHER

## 2021-03-15 NOTE — TELEPHONE ENCOUNTER
----- Message from [place] sent at 3/14/2021  9:37 AM EDT -----  Regarding: Prescription Question  Contact: [phone]  Can you refill my citalopram to the [facility] in [place] please? I only have 4 pills left and [facility] won't get here in time       Thank You

## 2021-03-31 DIAGNOSIS — Z23 ENCOUNTER FOR IMMUNIZATION: ICD-10-CM

## 2021-04-12 ENCOUNTER — APPOINTMENT (OUTPATIENT)
Dept: RADIOLOGY | Facility: CLINIC | Age: 26
End: 2021-04-12
Payer: COMMERCIAL

## 2021-04-12 ENCOUNTER — OFFICE VISIT (OUTPATIENT)
Dept: OBGYN CLINIC | Facility: CLINIC | Age: 26
End: 2021-04-12
Payer: COMMERCIAL

## 2021-04-12 VITALS
DIASTOLIC BLOOD PRESSURE: 78 MMHG | HEART RATE: 67 BPM | WEIGHT: 140 LBS | HEIGHT: 64 IN | BODY MASS INDEX: 23.9 KG/M2 | SYSTOLIC BLOOD PRESSURE: 129 MMHG

## 2021-04-12 DIAGNOSIS — M25.531 BILATERAL WRIST PAIN: ICD-10-CM

## 2021-04-12 DIAGNOSIS — M25.532 BILATERAL WRIST PAIN: ICD-10-CM

## 2021-04-12 DIAGNOSIS — M77.8 TENDONITIS OF BOTH WRISTS: ICD-10-CM

## 2021-04-12 DIAGNOSIS — R22.32 MASS OF LEFT WRIST: Primary | ICD-10-CM

## 2021-04-12 PROCEDURE — 3008F BODY MASS INDEX DOCD: CPT | Performed by: ORTHOPAEDIC SURGERY

## 2021-04-12 PROCEDURE — 73110 X-RAY EXAM OF WRIST: CPT

## 2021-04-12 PROCEDURE — 99203 OFFICE O/P NEW LOW 30 MIN: CPT | Performed by: ORTHOPAEDIC SURGERY

## 2021-04-12 NOTE — PROGRESS NOTES
Assessment/Plan:  1  Mass of left wrist  MRI wrist left wo contrast   2  Bilateral wrist pain  XR wrist 3+ vw right    XR wrist 3+ vw left   3  Tendonitis of both wrists  Cock Up Wrist Splint       Scribe Attestation    I,:  Dottie Chao MA am acting as a scribe while in the presence of the attending physician :       I,:  Susy Quiñonez DO personally performed the services described in this documentation    as scribed in my presence :             Joyce does have a cm mass radial aspect of her left wrist  A MRI of the left wrist was ordered today to better evaluate the mass  She is aware this is likely a benign cyst  I did explain to her if this is a cyst we can monitor this  If the mass is solid I would recommend excision  She was fitted and provided with bilateral cock-up wrist braces she may wear as needed  She will follow up once the MRI is complete to discuss the results  Subjective:   Flaca José is a 22 y o  female who presents to the office today for evaluation of bilateral wrist pain  She states this has been ongoing intermittently for years  She notes pain to the dorsal aspect of her wrists  She notes increased pain with use and while at work  She also notes increased pain with wrist extension  She denies any known injury or trauma  She also notes a mass to the dorsal aspect of her left wrist  She states she just noticed the mass a few weeks ago  Review of Systems   Constitutional: Negative for chills and fever  HENT: Negative for drooling and sneezing  Eyes: Negative for redness  Respiratory: Negative for cough and wheezing  Gastrointestinal: Negative for nausea and vomiting  Musculoskeletal: Negative for arthralgias, joint swelling and myalgias  Neurological: Negative for weakness and numbness  Psychiatric/Behavioral: Negative for behavioral problems  The patient is not nervous/anxious            Past Medical History:   Diagnosis Date    Acne     Anxiety     Depression Past Surgical History:   Procedure Laterality Date    NO PAST SURGERIES         Family History   Problem Relation Age of Onset    Chang's esophagus Mother     Breast cancer Paternal Grandfather     No Known Problems Father        Social History     Occupational History    Not on file   Tobacco Use    Smoking status: Never Smoker    Smokeless tobacco: Never Used   Substance and Sexual Activity    Alcohol use:  Yes     Alcohol/week: 3 0 standard drinks     Types: 3 Standard drinks or equivalent per week     Comment: Occasionally    Drug use: No    Sexual activity: Yes     Partners: Male     Birth control/protection: OCP         Current Outpatient Medications:     citalopram (CeleXA) 40 mg tablet, Take 1 tablet (40 mg total) by mouth daily, Disp: 90 tablet, Rfl: 3    clobetasol (TEMOVATE) 0 05 % cream, Apply topically 2 (two) times a day, Disp: 30 g, Rfl: 0    Multiple Vitamin (MULTI-VITAMIN DAILY PO), Take by mouth, Disp: , Rfl:     norethindrone-ethinyl estradiol-iron (Junel FE 1 5/30) 1 5-30 MG-MCG tablet, Take 1 tablet by mouth daily, Disp: 84 tablet, Rfl: 0    spironolactone (ALDACTONE) 50 mg tablet, Take 1 tablet (50 mg total) by mouth daily, Disp: 45 tablet, Rfl: 0    tretinoin (RETIN-A) 0 025 % cream, , Disp: , Rfl:     Allergies   Allergen Reactions    Sulfa Antibiotics Hives and Rash       Objective:  Vitals:    04/12/21 1655   BP: 129/78   Pulse: 67       Ortho Exam     Right wrist    NTTP 1st dorsal compartment  NTTP dorsal aspect  No palpable cysts  No pain with forced wrist extension   - finkelstein   Compartments soft  Brisk capillary refill  S/m intact median, radial, and ulnar nerve     Left wrist    NTTP dorsal aspect   1 cm palpable mass radial aspect of wrist  Mass is not pulsatile   Mass does not move with the tendons  - finkelstein    Compartments soft  Brisk capillary refill  S/m intact median, radial, and ulnar nerve    Physical Exam  Constitutional:       Appearance: She is well-developed  HENT:      Head: Normocephalic and atraumatic  Eyes:      General:         Right eye: No discharge  Left eye: No discharge  Conjunctiva/sclera: Conjunctivae normal    Neck:      Musculoskeletal: Normal range of motion and neck supple  Cardiovascular:      Rate and Rhythm: Normal rate  Pulmonary:      Effort: Pulmonary effort is normal  No respiratory distress  Musculoskeletal:      Comments: As noted in HPI   Skin:     General: Skin is warm and dry  Neurological:      Mental Status: She is alert and oriented to person, place, and time  Psychiatric:         Behavior: Behavior normal          Thought Content: Thought content normal          Judgment: Judgment normal          I have personally reviewed pertinent films in PACS and my interpretation is as follows:X-ray bilateral wrist performed in the office today demonstrates no osseous abnormalities

## 2021-04-20 DIAGNOSIS — F32.A ANXIETY AND DEPRESSION: ICD-10-CM

## 2021-04-20 DIAGNOSIS — F41.9 ANXIETY AND DEPRESSION: ICD-10-CM

## 2021-04-20 RX ORDER — CITALOPRAM 40 MG/1
40 TABLET ORAL DAILY
Qty: 90 TABLET | Refills: 3 | Status: SHIPPED | OUTPATIENT
Start: 2021-04-20 | End: 2021-12-01 | Stop reason: SDUPTHER

## 2021-04-29 DIAGNOSIS — L70.9 ACNE, UNSPECIFIED ACNE TYPE: ICD-10-CM

## 2021-04-29 RX ORDER — SPIRONOLACTONE 50 MG/1
50 TABLET, FILM COATED ORAL DAILY
Qty: 90 TABLET | Refills: 3 | Status: SHIPPED | OUTPATIENT
Start: 2021-04-29

## 2021-09-09 ENCOUNTER — TELEPHONE (OUTPATIENT)
Dept: OBGYN CLINIC | Facility: CLINIC | Age: 26
End: 2021-09-09

## 2021-09-09 NOTE — TELEPHONE ENCOUNTER
Patient called, she got the nexplanon inserted in March 2021  She has been having heavy bleeding since she got it in  She is only going 1 week without a period and then starts back up with bleeding  She has been bleeding for the last 3 weeks  She is changing a tampon every 4 hours  I discussed that nexplanon can have irregular bleeding patterns  She is just concerned because she always is bleeding and is only having 1 week off without a period  What else would you recommend for her to do?

## 2021-12-01 DIAGNOSIS — F41.9 ANXIETY AND DEPRESSION: ICD-10-CM

## 2021-12-01 DIAGNOSIS — F32.A ANXIETY AND DEPRESSION: ICD-10-CM

## 2021-12-01 RX ORDER — CITALOPRAM 40 MG/1
40 TABLET ORAL DAILY
Qty: 90 TABLET | Refills: 3 | Status: SHIPPED | OUTPATIENT
Start: 2021-12-01 | End: 2022-04-26 | Stop reason: SDUPTHER

## 2021-12-19 ENCOUNTER — NURSE TRIAGE (OUTPATIENT)
Dept: OTHER | Facility: OTHER | Age: 26
End: 2021-12-19

## 2021-12-19 DIAGNOSIS — Z20.828 SARS-ASSOCIATED CORONAVIRUS EXPOSURE: Primary | ICD-10-CM

## 2021-12-19 PROCEDURE — 87636 SARSCOV2 & INF A&B AMP PRB: CPT | Performed by: FAMILY MEDICINE

## 2021-12-20 DIAGNOSIS — Z20.822 CLOSE EXPOSURE TO COVID-19 VIRUS: Primary | ICD-10-CM

## 2022-02-17 ENCOUNTER — OFFICE VISIT (OUTPATIENT)
Dept: OBGYN CLINIC | Facility: CLINIC | Age: 27
End: 2022-02-17
Payer: COMMERCIAL

## 2022-02-17 VITALS
DIASTOLIC BLOOD PRESSURE: 68 MMHG | WEIGHT: 165.2 LBS | BODY MASS INDEX: 28.2 KG/M2 | SYSTOLIC BLOOD PRESSURE: 124 MMHG | HEIGHT: 64 IN

## 2022-02-17 DIAGNOSIS — Z30.46 ENCOUNTER FOR REMOVAL OF ETONOGESTREL IMPLANT: Primary | ICD-10-CM

## 2022-02-17 DIAGNOSIS — Z30.011 ENCOUNTER FOR INITIAL PRESCRIPTION OF CONTRACEPTIVE PILLS: ICD-10-CM

## 2022-02-17 PROCEDURE — 11982 REMOVE DRUG IMPLANT DEVICE: CPT | Performed by: OBSTETRICS & GYNECOLOGY

## 2022-02-17 RX ORDER — NORETHINDRONE ACETATE AND ETHINYL ESTRADIOL 1.5-30(21)
1 KIT ORAL DAILY
Qty: 84 TABLET | Refills: 4 | Status: SHIPPED | OUTPATIENT
Start: 2022-02-17

## 2022-02-17 NOTE — PROGRESS NOTES
Universal Protocol:  Consent: Verbal consent obtained  Written consent obtained  Risks and benefits: risks, benefits and alternatives were discussed  Consent given by: patient  Patient understanding: patient states understanding of the procedure being performed  Patient consent: the patient's understanding of the procedure matches consent given  Procedure consent: procedure consent matches procedure scheduled    Remove and insert drug implant    Date/Time: 2/17/2022 5:16 PM  Performed by: Bobby Parekh DO  Authorized by: Bobby Parekh DO     Indication:     Indication: Presence of non-biodegradable drug delivery implant    Pre-procedure:     Prepped with: povidone-iodine      Local anesthetic:  Lidocaine without epinephrine    The site was cleaned and prepped in a sterile fashion: yes    Procedure:     Procedure:  Removal    Small stab incision was made in arm: yes      Left/right:  Left    Site was closed with steri-strips and pressure bandage applied: yes    Comments:      Consent was obtained for Nexplanon removal with risks discussed including bleeding, scarring, infection, and failure to remove requiring removal in the OR  The patient verbalized understanding and wished to proceed  Removal due to abnormal bleeding profile  The patient was placed in the dorsal supine position with the left inner arm exposed  The Nexplanon was palpated in the upper arm  The patient was prepped in the usual fashion with betadine swabs x 3  About 2 mL of local anesthesia was applied just underneath the inferior tip of the device  A 0 5 cm incision was made with the scalpel parallel to the inferior tip of the device  The tip of the nexplanon was grasped with hemostats and removed  There was no significant bleeding noted  A pressure dressing was applied  The patient tolerated the procedure well         Rx for [facility] sent to pharmacy  Pt to start immediately  Call if AUB continues by end of 3rd pill pack

## 2022-03-03 DIAGNOSIS — R53.83 FATIGUE, UNSPECIFIED TYPE: Primary | ICD-10-CM

## 2022-03-05 ENCOUNTER — APPOINTMENT (OUTPATIENT)
Dept: LAB | Facility: HOSPITAL | Age: 27
End: 2022-03-05
Attending: FAMILY MEDICINE
Payer: COMMERCIAL

## 2022-03-05 DIAGNOSIS — R53.83 FATIGUE, UNSPECIFIED TYPE: ICD-10-CM

## 2022-03-05 LAB
ERYTHROCYTE [DISTWIDTH] IN BLOOD BY AUTOMATED COUNT: 12.4 % (ref 11.6–15.1)
HCT VFR BLD AUTO: 39.9 % (ref 34.8–46.1)
HGB BLD-MCNC: 13.4 G/DL (ref 11.5–15.4)
MCH RBC QN AUTO: 30.7 PG (ref 26.8–34.3)
MCHC RBC AUTO-ENTMCNC: 33.6 G/DL (ref 31.4–37.4)
MCV RBC AUTO: 91 FL (ref 82–98)
PLATELET # BLD AUTO: 283 THOUSANDS/UL (ref 149–390)
PMV BLD AUTO: 10.9 FL (ref 8.9–12.7)
RBC # BLD AUTO: 4.37 MILLION/UL (ref 3.81–5.12)
TSH SERPL DL<=0.05 MIU/L-ACNC: 2.56 UIU/ML (ref 0.36–3.74)
WBC # BLD AUTO: 7.59 THOUSAND/UL (ref 4.31–10.16)

## 2022-03-05 PROCEDURE — 85027 COMPLETE CBC AUTOMATED: CPT

## 2022-03-05 PROCEDURE — 36415 COLL VENOUS BLD VENIPUNCTURE: CPT

## 2022-03-05 PROCEDURE — 84443 ASSAY THYROID STIM HORMONE: CPT

## 2022-06-20 ENCOUNTER — OFFICE VISIT (OUTPATIENT)
Dept: FAMILY MEDICINE CLINIC | Facility: CLINIC | Age: 27
End: 2022-06-20
Payer: COMMERCIAL

## 2022-06-20 VITALS
WEIGHT: 164 LBS | BODY MASS INDEX: 28 KG/M2 | RESPIRATION RATE: 18 BRPM | TEMPERATURE: 97.9 F | SYSTOLIC BLOOD PRESSURE: 101 MMHG | OXYGEN SATURATION: 100 % | DIASTOLIC BLOOD PRESSURE: 68 MMHG | HEART RATE: 95 BPM | HEIGHT: 64 IN

## 2022-06-20 DIAGNOSIS — Z09 FOLLOW-UP EXAMINATION: Primary | ICD-10-CM

## 2022-06-20 PROCEDURE — 3008F BODY MASS INDEX DOCD: CPT | Performed by: FAMILY MEDICINE

## 2022-06-20 PROCEDURE — 3725F SCREEN DEPRESSION PERFORMED: CPT | Performed by: FAMILY MEDICINE

## 2022-06-20 PROCEDURE — 99213 OFFICE O/P EST LOW 20 MIN: CPT | Performed by: FAMILY MEDICINE

## 2022-06-20 PROCEDURE — 1036F TOBACCO NON-USER: CPT | Performed by: FAMILY MEDICINE

## 2022-06-27 NOTE — PROGRESS NOTES
Assessment/Plan:      Diagnoses and all orders for this visit:    Follow-up examination  - Recently started on Celexa for anxiety and depression  - Reports improvement in symptoms and no medication side effects  - Recently engages and inquires about safety for pregnancy if she and her partner decide on children    - Other medications have a better pregnancy classification so patient is encouraged to consult physician for medication change if pregnancy becomes desired  Subjective:     Patient ID: Shahab Euceda is a 32 y o  female  HPI   Started recently on Celexa for anxiety and depression  Reports improvement in symptoms and no medication side effects  Recently engages and inquires about safety for pregnancy if she and her partner decide on children  Review of Systems   Constitutional: Negative for chills, fatigue and fever  HENT: Negative for congestion, ear pain, hearing loss, rhinorrhea, sore throat and trouble swallowing  Eyes: Negative for pain and visual disturbance  Respiratory: Negative for cough and shortness of breath  Cardiovascular: Negative for chest pain  Gastrointestinal: Negative for constipation, diarrhea, nausea and vomiting  Endocrine: Negative for polyuria  Genitourinary: Negative for difficulty urinating and dysuria  Musculoskeletal: Negative for arthralgias and myalgias  Neurological: Negative for dizziness, light-headedness and headaches  Objective:     Physical Exam  Constitutional:       General: She is not in acute distress  Appearance: She is not toxic-appearing  HENT:      Head: Normocephalic and atraumatic  Eyes:      Pupils: Pupils are equal, round, and reactive to light  Cardiovascular:      Rate and Rhythm: Normal rate and regular rhythm  Heart sounds: Normal heart sounds  Pulmonary:      Effort: Pulmonary effort is normal    Abdominal:      Palpations: Abdomen is soft  Tenderness: There is no abdominal tenderness  Musculoskeletal:         General: No deformity  Cervical back: Normal range of motion  Right lower leg: No edema  Left lower leg: No edema  Skin:     General: Skin is warm and dry  Neurological:      Mental Status: She is alert and oriented to person, place, and time     Psychiatric:         Mood and Affect: Mood normal          Behavior: Behavior normal

## 2022-09-19 ENCOUNTER — OFFICE VISIT (OUTPATIENT)
Dept: OBGYN CLINIC | Facility: CLINIC | Age: 27
End: 2022-09-19
Payer: COMMERCIAL

## 2022-09-19 VITALS
BODY MASS INDEX: 28.17 KG/M2 | HEIGHT: 64 IN | DIASTOLIC BLOOD PRESSURE: 78 MMHG | SYSTOLIC BLOOD PRESSURE: 112 MMHG | WEIGHT: 165 LBS

## 2022-09-19 DIAGNOSIS — R82.90 ABNORMAL URINE ODOR: ICD-10-CM

## 2022-09-19 DIAGNOSIS — R30.0 DYSURIA: Primary | ICD-10-CM

## 2022-09-19 DIAGNOSIS — R35.0 URINE FREQUENCY: ICD-10-CM

## 2022-09-19 LAB
SL AMB  POCT GLUCOSE, UA: NEGATIVE
SL AMB LEUKOCYTE ESTERASE,UA: NEGATIVE
SL AMB POCT BILIRUBIN,UA: ABNORMAL
SL AMB POCT BLOOD,UA: POSITIVE
SL AMB POCT CLARITY,UA: CLEAR
SL AMB POCT COLOR,UA: YELLOW
SL AMB POCT KETONES,UA: NEGATIVE
SL AMB POCT NITRITE,UA: NEGATIVE
SL AMB POCT PH,UA: 6
SL AMB POCT SPECIFIC GRAVITY,UA: 1.02
SL AMB POCT URINE PROTEIN: NEGATIVE
SL AMB POCT UROBILINOGEN: NEGATIVE

## 2022-09-19 PROCEDURE — 87086 URINE CULTURE/COLONY COUNT: CPT | Performed by: PHYSICIAN ASSISTANT

## 2022-09-19 PROCEDURE — 81003 URINALYSIS AUTO W/O SCOPE: CPT | Performed by: PHYSICIAN ASSISTANT

## 2022-09-19 PROCEDURE — 99213 OFFICE O/P EST LOW 20 MIN: CPT | Performed by: PHYSICIAN ASSISTANT

## 2022-09-19 RX ORDER — NITROFURANTOIN 25; 75 MG/1; MG/1
100 CAPSULE ORAL 2 TIMES DAILY
Qty: 14 CAPSULE | Refills: 0 | Status: SHIPPED | OUTPATIENT
Start: 2022-09-19 | End: 2022-09-26

## 2022-09-19 NOTE — PROGRESS NOTES
Assessment/Plan:  - UA +blood  - Culture sent  - Start macrobid  - Go to the ER with fevers, chills, N/V/ABD pain  - Will reach out to patient with results  Diagnoses and all orders for this visit:    Dysuria  -     nitrofurantoin (MACROBID) 100 mg capsule; Take 1 capsule (100 mg total) by mouth 2 (two) times a day for 7 days    Abnormal urine odor  -     POCT urine dip auto non-scope  -     Urine culture    Urine frequency  -     POCT urine dip auto non-scope  -     Urine culture  -     nitrofurantoin (MACROBID) 100 mg capsule; Take 1 capsule (100 mg total) by mouth 2 (two) times a day for 7 days          Subjective:      Patient ID: Kelly Ryan is a 32 y o  female  Kelly Ryan is a 32year old female  presenting to the office today with complaints of dysuria and odor in urine for X3 days  She states that it feels as if she needs to go to the bathroom frequently and it burns upon urination  She denies trying any over the counter medications and nothing makes it better or worse  She denies lower back pain and cannot rate it on a scale of severity  Patient states that she has had prior UTI's in the past and that, "this feels similar"  She denies any history of STI's/STD's and states that she is only sexually active with one other person  She states that her last LMP was 2022 and is not worried about pregnancy due to contraception with birth control  Patient denies fever, lower back pain, chills, night sweats, abdominal pain or discomfort, or vaginal discharge  She states that she has feeling of dysuria, urinary frequency, and urine odor  She states that she has allergies to sulfa medications and has been treated with antibiotics in the past for her UTI's        The following portions of the patient's history were reviewed and updated as appropriate: allergies, current medications, past family history, past medical history, past social history, past surgical history and problem list     Review of Systems   Constitutional: Negative for chills, fever and unexpected weight change  Respiratory: Negative for shortness of breath  Cardiovascular: Negative for chest pain  Gastrointestinal: Negative for abdominal pain  Genitourinary: Positive for dysuria  Negative for vaginal bleeding  Musculoskeletal: Negative for back pain  Skin: Negative for rash  Psychiatric/Behavioral: Negative for dysphoric mood  The patient is not nervous/anxious  Objective:      /78 (BP Location: Right arm, Patient Position: Sitting, Cuff Size: Standard)   Ht 5' 4" (1 626 m)   Wt 74 8 kg (165 lb)   LMP 08/23/2022 (Approximate) Comment: almost 1 month ago  BMI 28 32 kg/m²          Physical Exam  Constitutional:       Appearance: Normal appearance  She is normal weight  HENT:      Head: Normocephalic and atraumatic  Cardiovascular:      Rate and Rhythm: Normal rate and regular rhythm  Heart sounds: No murmur heard  No friction rub  No gallop  Pulmonary:      Effort: Pulmonary effort is normal       Breath sounds: Normal breath sounds  Abdominal:      General: Abdomen is flat  There is no distension  Palpations: Abdomen is soft  Tenderness: There is no abdominal tenderness  There is no right CVA tenderness or left CVA tenderness  Skin:     General: Skin is warm and dry  Findings: No lesion or rash  Neurological:      General: No focal deficit present  Mental Status: She is alert     Psychiatric:         Mood and Affect: Mood normal          Behavior: Behavior normal

## 2022-09-20 DIAGNOSIS — R31.29 MICROSCOPIC HEMATURIA: Primary | ICD-10-CM

## 2022-09-20 LAB — BACTERIA UR CULT: NORMAL

## 2022-10-01 ENCOUNTER — HOSPITAL ENCOUNTER (OUTPATIENT)
Dept: RADIOLOGY | Facility: HOSPITAL | Age: 27
Discharge: HOME | End: 2022-10-01
Payer: COMMERCIAL

## 2022-10-01 DIAGNOSIS — R31.29 MICROSCOPIC HEMATURIA: ICD-10-CM

## 2022-10-01 PROCEDURE — 76775 US EXAM ABDO BACK WALL LIM: CPT

## 2023-02-14 ENCOUNTER — APPOINTMENT (OUTPATIENT)
Dept: LAB | Facility: CLINIC | Age: 28
End: 2023-02-14

## 2023-02-14 DIAGNOSIS — Z02.1 PRE-EMPLOYMENT HEALTH SCREENING EXAMINATION: ICD-10-CM

## 2023-02-15 LAB
MEV IGG SER QL IA: NORMAL
MUV IGG SER QL IA: NORMAL
RUBV IGG SERPL IA-ACNC: >175 IU/ML
VZV IGG SER QL IA: NORMAL

## 2023-02-16 LAB
GAMMA INTERFERON BACKGROUND BLD IA-ACNC: 0.04 IU/ML
M TB IFN-G BLD-IMP: NEGATIVE
M TB IFN-G CD4+ BCKGRND COR BLD-ACNC: 0.02 IU/ML
M TB IFN-G CD4+ BCKGRND COR BLD-ACNC: 0.02 IU/ML
MITOGEN IGNF BCKGRD COR BLD-ACNC: 7.82 IU/ML

## 2023-03-28 DIAGNOSIS — Z30.011 ENCOUNTER FOR INITIAL PRESCRIPTION OF CONTRACEPTIVE PILLS: ICD-10-CM

## 2023-03-28 RX ORDER — NORETHINDRONE ACETATE AND ETHINYL ESTRADIOL 1.5-30(21)
1 KIT ORAL DAILY
Qty: 84 TABLET | Refills: 1 | Status: SHIPPED | OUTPATIENT
Start: 2023-03-28

## 2023-06-05 ENCOUNTER — EVALUATION (OUTPATIENT)
Dept: PHYSICAL THERAPY | Facility: CLINIC | Age: 28
End: 2023-06-05
Payer: COMMERCIAL

## 2023-06-05 DIAGNOSIS — M54.50 ACUTE LOW BACK PAIN, UNSPECIFIED BACK PAIN LATERALITY, UNSPECIFIED WHETHER SCIATICA PRESENT: Primary | ICD-10-CM

## 2023-06-05 PROCEDURE — 97161 PT EVAL LOW COMPLEX 20 MIN: CPT | Performed by: PHYSICAL THERAPIST

## 2023-06-05 NOTE — PROGRESS NOTES
PT Evaluation     Today's date: 2023  Patient name: Rosio Pascual  : 1995  MRN: [id number]  Referring provider: Sosa Palencia DO  Dx:   Encounter Diagnosis     ICD-10-CM    1  Acute low back pain, unspecified back pain laterality, unspecified whether sciatica present  M54 50                      Assessment  Assessment details: Rosio Pascual is a 29 y o  female who presents to physical therapy with pain, decreased LE range of motion, decreased lumbar range of motion, impaired function, decreased activity tolerance, fair posture and poor body mechanics  Patient's clinical presentation is consistent with their referring diagnosis of Acute low back pain, unspecified back pain laterality, unspecified whether sciatica present  (primary encounter diagnosis)  The pt presents with functional limitations of ADLs, recreational activities, ambulation, performing household chores and self-care  Pt would benefit from physical therapy services to address these limitations and maximize function  Pt was instructed and educated on home exercise program today and demonstrates understanding  Impairments: abnormal gait, abnormal muscle firing, abnormal muscle tone, abnormal or restricted ROM, abnormal movement, activity intolerance, impaired balance, impaired physical strength, lacks appropriate home exercise program, pain with function, weight-bearing intolerance, poor posture  and poor body mechanics  Understanding of Dx/Px/POC: good   Prognosis: good    Goals  Short term goals:  (3 weeks)  1  Patient will have pain level 2/10 or less in lumbar spine with ADL's   2  Patient will report a 50% improvement in symptoms with ADL's  3  Patient will have normal lower extremity ROM  4  Patient will demonstrate correct sitting posture      Long term goals:  (6 weeks)  1  Patient will demonstrate a reduction in tenderness and tension in hypertonic musculature    2  Patient will report 85 % improvement improvement in symptoms with ADL's  3  Patient will have no complaints of pain with yard games  4  Patient will be independent in a comprehensive home exercise program       Plan  Patient would benefit from: PT eval and skilled physical therapy  Planned modality interventions: thermotherapy: hydrocollator packs and cryotherapy  Planned therapy interventions: ADL training, balance/weight bearing training, joint mobilization, manual therapy, massage, neuromuscular re-education, patient education, postural training, strengthening, stretching, functional ROM exercises, therapeutic activities, therapeutic exercise, gait training, home exercise program and abdominal trunk stabilization  Frequency: 2x week  Duration in visits: 12  Duration in weeks: 6  Treatment plan discussed with: patient        Subjective Evaluation    History of Present Illness  Mechanism of injury: She notes LBP for the last 5 years  It started when she was lifting weights more often  Now her pain happens depending on activity  She notes the pain possibly started this episode when she was playing pickleball  Pain  Current pain ratin  At best pain ratin  At worst pain ratin  Location: Right L-S  Quality: sharp, tight, pressure and knife-like    Social Support    Employment status: working ([address] [place] [address])  Patient Goals  Patient goals for therapy: decreased pain          Objective     Postural Observations  Seated posture: fair    Additional Postural Observation Details  Mils slouched posture in sitting    Palpation   Left   Hypertonic in the quadratus lumborum  Right   Hypertonic in the quadratus lumborum       Neurological Testing     Sensation     Lumbar   Left   Intact: light touch    Right   Intact: light touch    Active Range of Motion     Lumbar   Flexion:  WFL  Extension:  with pain Restriction level: minimal  Left lateral flexion:  Restriction level: minimal  Right lateral flexion:  Restriction level: minimal  Left rotation:  WFL  Right rotation: [facility]  Mechanical Assessment    Cervical      Thoracic      Lumbar    Standing flexion: repeated movements   Pain location:no change  Standing extension: repeated movements  Pain location: no change    Strength/Myotome Testing     Left Hip   Planes of Motion   Flexion: 5    Right Hip   Planes of Motion   Flexion: 5    Left Knee   Flexion: 5  Extension: 5    Right Knee   Flexion: 5  Extension: 5    Left Ankle/Foot   Dorsiflexion: 5  Plantar flexion: 5    Right Ankle/Foot   Dorsiflexion: 5  Plantar flexion: 5    Tests     Additional Tests Details  Left leg shorter than right leg  Supine to long sit test was negative  General Comments:      Lumbar Comments  Bony landmarks indicated left up-slip                 Eval/ Re-eval Auth #/ Referral # Total visits Start date  Expiration date Total active units  Total manual units  PT only or  PT+OT?   6/5/23 IE                                     6/5              Total units authorized                Total units remaining                      Precautions:  Standard      Specialty Daily Treatment Diary     Manuals 6/5/23       Visit # 1       STM QL paraspinals         Stretch  Quad psoas HS        Left up-slip correction AF               Warm-up        Bike        Neuro Re-Ed        Gset prone        TA brace w cuff        Pallof press        Supine march                Ther Ex        Mini squat        Side step        Knee ext w df 20       Clamshell                        Ther Activity                                Modalities

## 2023-06-13 ENCOUNTER — OFFICE VISIT (OUTPATIENT)
Dept: PHYSICAL THERAPY | Facility: CLINIC | Age: 28
End: 2023-06-13
Payer: COMMERCIAL

## 2023-06-13 DIAGNOSIS — M54.50 ACUTE LOW BACK PAIN, UNSPECIFIED BACK PAIN LATERALITY, UNSPECIFIED WHETHER SCIATICA PRESENT: Primary | ICD-10-CM

## 2023-06-13 PROCEDURE — 97110 THERAPEUTIC EXERCISES: CPT | Performed by: PHYSICAL THERAPIST

## 2023-06-13 PROCEDURE — 97140 MANUAL THERAPY 1/> REGIONS: CPT | Performed by: PHYSICAL THERAPIST

## 2023-06-13 NOTE — PROGRESS NOTES
Daily Note     Today's date: 2023  Patient name: Xavier Montgomery  : 1995  MRN: [id number]  Referring provider: Rico Jensen DO  Dx:   Encounter Diagnosis     ICD-10-CM    1  Acute low back pain, unspecified back pain laterality, unspecified whether sciatica present  M54 50                      Subjective: No pain currently      Objective: See treatment diary below      Assessment: Tolerated treatment well  Patient would benefit from continued PT  She had left up-slip  This corrected well with leg pull  She would benefit from continued core stabs to prevent re-occurrence  Plan: Continue per plan of care  Progress treatment as tolerated     Progress core stabs     Eval/ Re-eval Auth #/ Referral # Total visits Start date  Expiration date Total active units  Total manual units  PT only or  PT+OT?   23 IE                                                  Total units authorized                Total units remaining                      Precautions:  Standard      Specialty Daily Treatment Diary     Manuals 23      Visit # 1 2      STM QL paraspinals   3 min        Stretch  Quad psoas HS  5 min      Left up-slip correction Left side AF AF              Warm-up        Bike  5 min      Neuro Re-Ed        Gset prone        TA brace w cuff        Pallof press        Supine       Hand knee push  10 ea              Ther Ex        Mini squat        Side step        Knee ext w df 20 20      Clamshell  20  Yellow loop                      Ther Activity                                Modalities        MH  deferred

## 2023-06-19 ENCOUNTER — OFFICE VISIT (OUTPATIENT)
Dept: PHYSICAL THERAPY | Facility: CLINIC | Age: 28
End: 2023-06-19
Payer: COMMERCIAL

## 2023-06-19 DIAGNOSIS — M54.50 ACUTE LOW BACK PAIN, UNSPECIFIED BACK PAIN LATERALITY, UNSPECIFIED WHETHER SCIATICA PRESENT: Primary | ICD-10-CM

## 2023-06-19 PROCEDURE — 97110 THERAPEUTIC EXERCISES: CPT | Performed by: PHYSICAL THERAPIST

## 2023-06-19 PROCEDURE — 97140 MANUAL THERAPY 1/> REGIONS: CPT | Performed by: PHYSICAL THERAPIST

## 2023-06-19 NOTE — PROGRESS NOTES
Daily Note     Today's date: 2023  Patient name: Helen Cummins  : 1995  MRN: [id number]  Referring provider: Alfredito Escoto DO  Dx:   Encounter Diagnosis     ICD-10-CM    1  Acute low back pain, unspecified back pain laterality, unspecified whether sciatica present  M54 50                      Subjective: She had a busy weekend prepping for a party and feels soreness because of it  Objective: See treatment diary below      Assessment: Tolerated treatment well  Patient would benefit from continued PT  Addition of stability exercises in standing were completed without increase in pain  She felt improvement following leg and low back stretching  Plan: Continue per plan of care  Progress treatment as tolerated     Progress core stabs     Eval/ Re-eval Auth #/ Referral # Total visits Start date  Expiration date Total active units  Total manual units  PT only or  PT+OT?   23 IE                                                  Total units authorized                Total units remaining                      Precautions:  Standard      Specialty Daily Treatment Diary     Manuals 23     Visit # 1 2 3     STM QL paraspinals   3 min   3 min     Stretch  Quad psoas HS  5 min 5 min     Left up-slip correction Left side AF AF              Warm-up        Bike  5 min 5 min     Neuro Re-Ed        Gset prone        TA brace w cuff        Pallof press   20  BTB     Supine       Hand knee push  10 ea Knee elbow push  20x pilates ring             Ther Ex        Mini squat   20  18#       Side step   4 x 8 ft  Blue loop     Knee ext w df 20 20      Clamshell  20  Yellow loop                      Ther Activity                                Modalities        MH  deferred

## 2023-06-26 ENCOUNTER — OFFICE VISIT (OUTPATIENT)
Dept: PHYSICAL THERAPY | Facility: CLINIC | Age: 28
End: 2023-06-26
Payer: COMMERCIAL

## 2023-06-26 DIAGNOSIS — M54.50 ACUTE LOW BACK PAIN, UNSPECIFIED BACK PAIN LATERALITY, UNSPECIFIED WHETHER SCIATICA PRESENT: Primary | ICD-10-CM

## 2023-06-26 PROCEDURE — 97110 THERAPEUTIC EXERCISES: CPT | Performed by: PHYSICAL THERAPIST

## 2023-06-26 PROCEDURE — 97140 MANUAL THERAPY 1/> REGIONS: CPT | Performed by: PHYSICAL THERAPIST

## 2023-06-26 NOTE — PROGRESS NOTES
Daily Note         Today's date: 2023  Patient name: Maile Melendez  : 1995  MRN: [id number]  Referring provider: Theo Harman DO  Dx:   Encounter Diagnosis     ICD-10-CM    1  Acute low back pain, unspecified back pain laterality, unspecified whether sciatica present  M54 50           Subjective: Maile Melendez reports typically right side low back  0/10 pain level enteirng today  States she has not had any bad days  Objective: See treatment diary below    Assessment:  patient new to physical therpasit  primary PT disucssed PLOC  patient dmeonstrated minimal to no soft tissue restriciton in lumbar musuclature with STM  patient dmeonstrated no leg length discrepancy when tested in todays session  mod LOM noted with piriformis stretch beatrice  Plan: progress as indicated by primary PT to increase flexibility andiimprove stability          Eval/ Re-eval Auth #/ Referral # Total visits Start date  Expiration date Total active units  Total manual units  PT only or  PT+OT?   23 IE                                                Total units authorized                Total units remaining                      Precautions:  Standard      Specialty Daily Treatment Diary     Manuals 23    Visit # 1 2 3 4    STM QL paraspinals   3 min   3 min 5 min performed focus on right     Stretch  Quad psoas HS  5 min 5 min 7 min hip flexor, hamstring and piriformis    Left up-slip correction Left side AF AF              Warm-up        Bike  5 min 5 min 5 min     Neuro Re-Ed        Gset prone        TA brace w cuff        Pallof press   20  BTB Blue: 20 x beatrice     Supine       Hand knee push  10 ea Knee elbow push  20x pilates ring Knee elbow push  20x pilates ring            Ther Ex        Mini squat   20  18#   18 lb: 10 x 2     Side step   4 x 8 ft  Blue loop 16 feet x 2 x 3 rounds     Knee ext w df 20 20  20 x beatrice     Clamshell  20  Yellow loop  TA yellow: 20 x                    Ther Activity                                Modalities        MH  deferred  --

## 2023-07-03 ENCOUNTER — APPOINTMENT (OUTPATIENT)
Dept: PHYSICAL THERAPY | Facility: CLINIC | Age: 28
End: 2023-07-03
Payer: COMMERCIAL

## 2023-07-06 ENCOUNTER — OFFICE VISIT (OUTPATIENT)
Dept: PHYSICAL THERAPY | Facility: CLINIC | Age: 28
End: 2023-07-06
Payer: COMMERCIAL

## 2023-07-06 DIAGNOSIS — M54.50 ACUTE LOW BACK PAIN, UNSPECIFIED BACK PAIN LATERALITY, UNSPECIFIED WHETHER SCIATICA PRESENT: Primary | ICD-10-CM

## 2023-07-06 PROCEDURE — 97140 MANUAL THERAPY 1/> REGIONS: CPT | Performed by: PHYSICAL THERAPIST

## 2023-07-06 PROCEDURE — 97110 THERAPEUTIC EXERCISES: CPT | Performed by: PHYSICAL THERAPIST

## 2023-07-06 NOTE — PROGRESS NOTES
Daily Note     Today's date: 2023  Patient name: Rossy Schuster  : 1995  MRN: [id number]  Referring provider: Nicolas Vasquez DO  Dx:   Encounter Diagnosis     ICD-10-CM    1. Acute low back pain, unspecified back pain laterality, unspecified whether sciatica present  M54.50                      Subjective:  She has occasional soreness in low back. Better overall but remains. Objective: See treatment diary below      Assessment: Tolerated treatment well. Lower T-E P to A tightness is present. She would benefit from continued spinal mobility and stabilization of L-S. Plan: Continue per plan of care. Progress treatment as tolerated.        Eval/ Re-eval Auth #/ Referral # Total visits Start date  Expiration date Total active units  Total manual units  PT only or  PT+OT?   23 IE                                               Total units authorized                Total units remaining                      Precautions:  Standard      Specialty Daily Treatment Diary     Manuals 23   Visit # 1 2 3 4 5   STM QL paraspinals   3 min   3 min 5 min performed focus on right  5 min bilat QL   Stretch  Quad psoas HS  5 min 5 min 7 min hip flexor, hamstring and piriformis 5 min HS psoas    Left up-slip correction Left side AF AF              Warm-up        Bike  5 min 5 min 5 min  5 min   Neuro Re-Ed        Gset prone        TA brace w cuff        Pallof press   20  BTB Blue: 20 x beatrice     Supine march  20      Hand knee push  10 ea Knee elbow push  20x pilates ring Knee elbow push  20x pilates ring 20 using pilates ring           Ther Ex        Mini squat   20  18#   18 lb: 10 x 2     Side step   4 x 8 ft  Blue loop 16 feet x 2 x 3 rounds     Knee ext w df 20 20  20 x beatrice     Clamshell  20  Yellow loop  TA yellow: 20 x 20  Yellow loop        Cat camel 10x           Ther Activity                                Modalities        MH  deferred  --

## 2023-07-07 ENCOUNTER — ANNUAL EXAM (OUTPATIENT)
Dept: OBGYN CLINIC | Facility: CLINIC | Age: 28
End: 2023-07-07
Payer: COMMERCIAL

## 2023-07-07 VITALS
WEIGHT: 180 LBS | BODY MASS INDEX: 30.73 KG/M2 | DIASTOLIC BLOOD PRESSURE: 68 MMHG | HEIGHT: 64 IN | SYSTOLIC BLOOD PRESSURE: 110 MMHG

## 2023-07-07 DIAGNOSIS — Z12.4 SCREENING FOR MALIGNANT NEOPLASM OF CERVIX: ICD-10-CM

## 2023-07-07 DIAGNOSIS — Z01.419 WELL WOMAN EXAM WITH ROUTINE GYNECOLOGICAL EXAM: Primary | ICD-10-CM

## 2023-07-07 DIAGNOSIS — Z30.011 ENCOUNTER FOR INITIAL PRESCRIPTION OF CONTRACEPTIVE PILLS: ICD-10-CM

## 2023-07-07 PROCEDURE — G0145 SCR C/V CYTO,THINLAYER,RESCR: HCPCS | Performed by: OBSTETRICS & GYNECOLOGY

## 2023-07-07 PROCEDURE — S0612 ANNUAL GYNECOLOGICAL EXAMINA: HCPCS | Performed by: OBSTETRICS & GYNECOLOGY

## 2023-07-07 RX ORDER — NORETHINDRONE ACETATE AND ETHINYL ESTRADIOL 1.5-30(21)
1 KIT ORAL DAILY
Qty: 84 TABLET | Refills: 4 | Status: SHIPPED | OUTPATIENT
Start: 2023-07-07

## 2023-07-07 NOTE — PROGRESS NOTES
ASSESSMENT & PLAN:   Diagnoses and all orders for this visit:    Well woman exam with routine gynecological exam  -     Liquid-based pap, screening    Screening for malignant neoplasm of cervix  -     Liquid-based pap, screening    Encounter for initial prescription of contraceptive pills  -     norethindrone-ethinyl estradiol-iron (Junel FE 1.5/30) 1.5-30 MG-MCG tablet; Take 1 tablet by mouth daily          The following were reviewed in today's visit: ASCCP guidelines, breast self exam, family planning choices, exercise and healthy diet. Start PNV when stopping CHC. Call office with positive UPT. OK to continue celexa. Pt states she is no longer using retin-A. Patient to return to office in yearly for annual exam.     All questions have been answered to her satisfaction. CC:  Annual Gynecologic Examination  Chief Complaint   Patient presents with   • Gynecologic Exam     Patient is here today for her yearly exam, pap due today(10/5/18-wnl). Patient is doing well, she has no concerns at this time. HPI: Christian Carmona [facility] 29 [facility] for annual gynecologic examination. She has the following concerns:  None. Planning to try for pregnancy in the fall. Health Maintenance:    Exercise: infrequently  Breast exams/breast awareness: no  Diet: well balanced diet      Past Medical History:   Diagnosis Date   • Acne    • Anxiety    • Depression        Past Surgical History:   Procedure Laterality Date   • NO PAST SURGERIES         Past OB/Gyn History:  Period Duration (Days): 4  Period Pattern: Regular  Menstrual Flow: Moderate  Dysmenorrhea: (!) Mild  Dysmenorrhea Symptoms: CrampingNo LMP recorded. (Menstrual status: Birth Control). Last Pap: 10/2018 : no abnormalities  History of abnormal Pap smear: no  HPV vaccine completed: yes    Patient is currently sexually active.    STD testing: no  Current contraception: OCP (estrogen/progesterone)      Family History  Family History Problem Relation Age of Onset   • Chang's esophagus Mother    • No Known Problems Father    • No Known Problems Brother    • Breast cancer Paternal Grandfather        Family history of uterine or ovarian cancer: no  Family history of breast cancer: yes  Family history of colon cancer: no    Social History:  Social History     Socioeconomic History   • Marital status: Single     Spouse name: Not on file   • Number of children: Not on file   • Years of education: Not on file   • Highest education level: Not on file   Occupational History   • Not on file   Tobacco Use   • Smoking status: Never   • Smokeless tobacco: Never   Vaping Use   • Vaping Use: Never used   Substance and Sexual Activity   • Alcohol use: Yes     Alcohol/week: 3.0 standard drinks of alcohol     Types: 3 Standard drinks or equivalent per week     Comment: Occasionally   • Drug use: No   • Sexual activity: Yes     Partners: Male     Birth control/protection: OCP   Other Topics Concern   • Not on file   Social History Narrative    [facility]     Social Determinants of Health     Financial Resource Strain: Not on file   Food Insecurity: Not on file   Transportation Needs: Not on file   Physical Activity: Not on file   Stress: Not on file   Social Connections: Not on file   Intimate Partner Violence: Not on file   Housing Stability: Not on file     Domestic violence screen: negative    Allergies:   Allergies   Allergen Reactions   • Sulfa Antibiotics Hives and Rash       Medications:    Current Outpatient Medications:   •  Multiple Vitamin (MULTI-VITAMIN DAILY PO), Take by mouth, Disp: , Rfl:   •  norethindrone-ethinyl estradiol-iron (Junel FE 1.5/30) 1.5-30 MG-MCG tablet, Take 1 tablet by mouth daily, Disp: 84 tablet, Rfl: 1  •  tretinoin (RETIN-A) 0.025 % cream, Apply topically daily at bedtime, Disp: 45 g, Rfl: 5  •  citalopram (CeleXA) 40 mg tablet, Take 1 tablet (40 mg total) by mouth daily, Disp: 30 tablet, Rfl: 0    Review of Systems:  Denies fevers, chills, unintentional weight loss, excessive fatigue, chest pain, shortness of breath, abdominal pain, nausea, vomiting, urinary incontinence, urinary frequency, vaginal bleeding, vaginal discharge. All other systems negative unless otherwise stated. Physical Exam:  /68 (BP Location: Right arm, Patient Position: Sitting, Cuff Size: Large)   Ht 5' 4" (1.626 m)   Wt 81.6 kg (180 lb)   BMI 30.90 kg/m²  Body mass index is 30.9 kg/m². GEN: The patient was alert and oriented x3, pleasant well-appearing female in no acute distress. HEENT:  Unremarkable, no anterior or posterior lymphadenopathy, no thyromegaly  CV:  Regular rate and rhythm, normal S1 and S2, no murmurs  RESP:  Clear to auscultation bilaterally, no wheezes, rales or rhonchi  BREAST:  Symmetric breasts with no palpable breast masses or obvious breast lesions. She has no retractions or nipple discharge. She has no axillary abnormalities or palpable masses. GI:  Soft, nontender, non-distended  MSK: bilateral lower extremities are nontender, no edema  : Normal appearing external female genitalia, normal appearing urethral meatus. On sterile speculum exam, normal appearing vaginal epithelium, no vaginal discharge, no bleeding, grossly normal appearing cervix. On bimanual exam, no cervical motion tenderness; uterus is smooth, mobile, nontender 6 weeks size. No tenderness or fullness in the bilateral adnexa.

## 2023-07-10 ENCOUNTER — APPOINTMENT (OUTPATIENT)
Dept: PHYSICAL THERAPY | Facility: CLINIC | Age: 28
End: 2023-07-10
Payer: COMMERCIAL

## 2023-07-14 LAB
LAB AP GYN PRIMARY INTERPRETATION: NORMAL
Lab: NORMAL

## 2023-07-17 ENCOUNTER — OFFICE VISIT (OUTPATIENT)
Dept: PHYSICAL THERAPY | Facility: CLINIC | Age: 28
End: 2023-07-17
Payer: COMMERCIAL

## 2023-07-17 DIAGNOSIS — M54.50 ACUTE LOW BACK PAIN, UNSPECIFIED BACK PAIN LATERALITY, UNSPECIFIED WHETHER SCIATICA PRESENT: Primary | ICD-10-CM

## 2023-07-17 PROCEDURE — 97110 THERAPEUTIC EXERCISES: CPT | Performed by: PHYSICAL THERAPIST

## 2023-07-17 NOTE — PROGRESS NOTES
Daily Note     Today's date: 2023  Patient name: Launa Severin  : 1995  MRN: [id number]  Referring provider: Jae Martinez DO  Dx:   Encounter Diagnosis     ICD-10-CM    1. Acute low back pain, unspecified back pain laterality, unspecified whether sciatica present  M54.50                      Subjective:  [place] reports no pain. Objective: See treatment diary below      Assessment: Tolerated treatment well. Preventing right trunk rotation was more difficult for her than preventing left rotation. Plan: Continue per plan of care. Progress treatment as tolerated.           Eval/ Re-eval Auth #/ Referral # Total visits Start date  Expiration date Total active units  Total manual units  PT only or  PT+OT?   23 IE                                              Total units authorized                Total units remaining                      Precautions:  Standard      Specialty Daily Treatment Diary     Manuals 23   Visit # 2 3 4 5 6   STM QL paraspinals  3 min   3 min 5 min performed focus on right  5 min bilat QL 3'min   Stretch  Quad psoas HS 5 min 5 min 7 min hip flexor, hamstring and piriformis 5 min HS psoas  3 min   Left up-slip correction Left side AF            4 ' L-S distraction   Warm-up        Bike 5 min 5 min 5 min  5 min 5 min   Neuro Re-Ed        TA brace w cuff        Pallof press  20  BTB Blue: 20 x beatrice    Manual resist 5x ea side   Supine march 20       Hand knee push 10 ea Knee elbow push  20x pilates ring Knee elbow push  20x pilates ring 20 using pilates ring 20 w pilates ring           Ther Ex        Mini squat  20  18#   18 lb: 10 x 2   20  18# kb   Side step  4 x 8 ft  Blue loop 16 feet x 2 x 3 rounds      Knee ext w df 20  20 x beatrice      Clamshell 20  Yellow loop  TA yellow: 20 x 20  Yellow loop        Cat camel 10x Child's pose  10x           Ther Activity                                Modalities  deferred  --

## 2023-07-24 ENCOUNTER — OFFICE VISIT (OUTPATIENT)
Dept: PHYSICAL THERAPY | Facility: CLINIC | Age: 28
End: 2023-07-24
Payer: COMMERCIAL

## 2023-07-24 DIAGNOSIS — M54.50 ACUTE LOW BACK PAIN, UNSPECIFIED BACK PAIN LATERALITY, UNSPECIFIED WHETHER SCIATICA PRESENT: Primary | ICD-10-CM

## 2023-07-24 PROCEDURE — 97110 THERAPEUTIC EXERCISES: CPT | Performed by: PHYSICAL THERAPIST

## 2023-07-24 PROCEDURE — 97112 NEUROMUSCULAR REEDUCATION: CPT | Performed by: PHYSICAL THERAPIST

## 2023-07-24 NOTE — PROGRESS NOTES
Daily Note     Today's date: 2023  Patient name: Sindi Martin  : 1995  MRN: [id number]  Referring provider: Fabien Bernal DO  Dx:   Encounter Diagnosis     ICD-10-CM    1. Acute low back pain, unspecified back pain laterality, unspecified whether sciatica present  M54.50                      Subjective:  [place] reports that her back is feeling better. Objective: See treatment diary below      Assessment: Tolerated treatment well. She is making great progress. She would benefit from continued core stabs. Plan: Continue per plan of care. Progress treatment as tolerated.     Add half kneel chops       Eval/ Re-eval Auth #/ Referral # Total visits Start date  Expiration date Total active units  Total manual units  PT only or  PT+OT?   23 IE                                             Total units authorized                Total units remaining                      Precautions:  Standard      Specialty Daily Treatment Diary     Manuals 23   Visit # 3 4 5 6 7   STM QL paraspinals  3 min 5 min performed focus on right  5 min bilat QL 3'min 3'   Stretch  Quad psoas HS 5 min 7 min hip flexor, hamstring and piriformis 5 min HS psoas  3 min 3'   Left up-slip correction Left side            4 ' L-S distraction 2' distraction thru leg pull   Warm-up        Bike 5 min 5 min  5 min 5 min 5 min   Neuro Re-Ed        TA brace w cuff     TA brace w add squeeze, 5# iso hold @ 120°   Pallof press 20  BTB Blue: 20 x beatrice    Manual resist 5x ea side    Supine march        Hand knee push Knee elbow push  20x pilates ring Knee elbow push  20x pilates ring 20 using pilates ring 20 w pilates ring 20 w pilates ring           Ther Ex        Mini squat 20  18#   18 lb: 10 x 2   20  18# kb    Side step 4 x 8 ft  Blue loop 16 feet x 2 x 3 rounds       Knee ext w df  20 x beatrice       Clamshell  TA yellow: 20 x 20  Yellow loop  20  Yellow loop      Cat camel 10x Child's pose  10x            Ther Activity        1/2 kneel chops                        Modalities          --

## 2023-07-26 ENCOUNTER — APPOINTMENT (OUTPATIENT)
Dept: PHYSICAL THERAPY | Facility: CLINIC | Age: 28
End: 2023-07-26
Payer: COMMERCIAL

## 2023-08-03 ENCOUNTER — APPOINTMENT (OUTPATIENT)
Dept: PHYSICAL THERAPY | Facility: CLINIC | Age: 28
End: 2023-08-03
Payer: COMMERCIAL

## 2023-08-10 ENCOUNTER — OFFICE VISIT (OUTPATIENT)
Dept: PHYSICAL THERAPY | Facility: CLINIC | Age: 28
End: 2023-08-10
Payer: COMMERCIAL

## 2023-08-10 DIAGNOSIS — M54.50 ACUTE LOW BACK PAIN, UNSPECIFIED BACK PAIN LATERALITY, UNSPECIFIED WHETHER SCIATICA PRESENT: Primary | ICD-10-CM

## 2023-08-10 PROCEDURE — 97110 THERAPEUTIC EXERCISES: CPT | Performed by: PHYSICAL THERAPIST

## 2023-08-10 PROCEDURE — 97112 NEUROMUSCULAR REEDUCATION: CPT | Performed by: PHYSICAL THERAPIST

## 2023-08-10 NOTE — PROGRESS NOTES
Daily Note     Today's date: 8/10/2023  Patient name: Sindi Martin  : 1995  MRN: [id number]  Referring provider: Fabien Bernal DO  Dx:   Encounter Diagnosis     ICD-10-CM    1. Acute low back pain, unspecified back pain laterality, unspecified whether sciatica present  M54.50                      Subjective:  [place] reports that her back is feeling better. Objective: See treatment diary below      Assessment: Tolerated treatment well. She is making great progress. She would benefit from continued core stabs. Plan: Continue per plan of care. Progress treatment as tolerated.            Eval/ Re-eval Auth #/ Referral # Total visits Start date  Expiration date Total active units  Total manual units  PT only or  PT+OT?   23 IE                                     6/5 6/13 6/19 6/26 7/6 7/17 7/24 8/10       Total units authorized                Total units remaining                      Precautions:  Standard      Specialty Daily Treatment Diary     Manuals 6/26/23 7/6/23 7/17/23 7/24/23 8/10/23   Visit # 4 5 6 7 8   STM QL paraspinals  5 min performed focus on right  5 min bilat QL 3'min 3' 4'   Stretch  Quad psoas HS 7 min hip flexor, hamstring and piriformis 5 min HS psoas  3 min 3' 5'   Left up-slip correction Left side     3' PA glides L-S      4 ' L-S distraction 2' distraction thru leg pull 2' leg pull   Warm-up        Bike 5 min  5 min 5 min 5 min 5 min   Neuro Re-Ed        TA brace w cuff    TA brace w add squeeze, 5# iso hold @ 120° TA brace w add squeeze, 5# iso hold @ 120°   Pallof press Blue: 20 x beatrice    Manual resist 5x ea side     Supine march        Hand knee push Knee elbow push  20x pilates ring 20 using pilates ring 20 w pilates ring 20 w pilates ring            Ther Ex        Mini squat 18 lb: 10 x 2   20  18# kb     Side step 16 feet x 2 x 3 rounds        Knee ext w df 20 x beatrice        Clamshell TA yellow: 20 x 20  Yellow loop  20  Yellow loop      Cat camel 10x Child's pose  10x             Ther Activity        1/2 kneel chops     12.5#  CC  20 standing chops                   Modalities         --

## 2023-08-17 ENCOUNTER — OFFICE VISIT (OUTPATIENT)
Dept: PHYSICAL THERAPY | Facility: CLINIC | Age: 28
End: 2023-08-17
Payer: COMMERCIAL

## 2023-08-17 DIAGNOSIS — M54.50 ACUTE LOW BACK PAIN, UNSPECIFIED BACK PAIN LATERALITY, UNSPECIFIED WHETHER SCIATICA PRESENT: Primary | ICD-10-CM

## 2023-08-17 PROCEDURE — 97110 THERAPEUTIC EXERCISES: CPT | Performed by: PHYSICAL THERAPIST

## 2023-08-17 PROCEDURE — 97112 NEUROMUSCULAR REEDUCATION: CPT | Performed by: PHYSICAL THERAPIST

## 2023-08-17 NOTE — PROGRESS NOTES
Daily Note     Today's date: 2023  Patient name: Eve Mayfield  : 1995  MRN: [id number]  Referring provider: Maren Miramontes DO  Dx:   Encounter Diagnosis     ICD-10-CM    1. Acute low back pain, unspecified back pain laterality, unspecified whether sciatica present  M54.50                      Subjective:  [place] reports that her back is feeling better. Objective: See treatment diary below      Assessment: Tolerated treatment well. She is making great progress. She would benefit from continued core stabs. Plan: Continue per plan of care. Progress treatment as tolerated.            Eval/ Re-eval Auth #/ Referral # Total visits Start date  Expiration date Total active units  Total manual units  PT only or  PT+OT?   23 IE                                     6/5 6/13 6/19 6/26 7/6 7/17 7/24 8/10       Total units authorized                Total units remaining                      Precautions:  Standard      Specialty Daily Treatment Diary     Manuals 7/6/23 7/17/23 7/24/23 8/10/23 8/17/23   Visit # 5 6 7 8 9   STM QL paraspinals  5 min bilat QL 3'min 3' 4' 3'   Stretch  Quad psoas HS 5 min HS psoas  3 min 3' 5' 5'   Left up-slip correction Left side    3' PA glides L-S 4'  Gr V to L-S rotation and T-S P to A     4 ' L-S distraction 2' distraction thru leg pull 2' leg pull    Warm-up        Bike 5 min 5 min 5 min 5 min 5 min   Neuro Re-Ed        TA brace w cuff   TA brace w add squeeze, 5# iso hold @ 120° TA brace w add squeeze, 5# iso hold @ 120°    Pallof press   Manual resist 5x ea side      Supine march     Legs at 90/90 unilat flex w BTB in supine 20x   Hand knee push 20 using pilates ring 20 w pilates ring 20 w pilates ring  20 w pilates ring           Ther Ex        Mini squat  20  18# kb   20 18#   Side step        Knee ext w df        Clamshell 20  Yellow loop  20  Yellow loop      Cat camel 10x Child's pose  10x              Ther Activity        1/2 kneel chops    12.5#  CC  20 standing chops                    Modalities        MH

## 2023-08-21 ENCOUNTER — OFFICE VISIT (OUTPATIENT)
Dept: PHYSICAL THERAPY | Facility: CLINIC | Age: 28
End: 2023-08-21
Payer: COMMERCIAL

## 2023-08-21 DIAGNOSIS — M54.50 ACUTE LOW BACK PAIN, UNSPECIFIED BACK PAIN LATERALITY, UNSPECIFIED WHETHER SCIATICA PRESENT: Primary | ICD-10-CM

## 2023-08-21 DIAGNOSIS — F41.9 ANXIETY AND DEPRESSION: ICD-10-CM

## 2023-08-21 DIAGNOSIS — F32.A ANXIETY AND DEPRESSION: ICD-10-CM

## 2023-08-21 PROCEDURE — 97110 THERAPEUTIC EXERCISES: CPT | Performed by: PHYSICAL THERAPIST

## 2023-08-21 PROCEDURE — 97140 MANUAL THERAPY 1/> REGIONS: CPT | Performed by: PHYSICAL THERAPIST

## 2023-08-21 RX ORDER — CITALOPRAM 40 MG/1
40 TABLET ORAL DAILY
Qty: 30 TABLET | Refills: 5 | Status: SHIPPED | OUTPATIENT
Start: 2023-08-21

## 2023-08-21 NOTE — PROGRESS NOTES
Daily Note     Today's date: 2023  Patient name: Olegario Goss  : 1995  MRN: [id number]  Referring provider: Josee Ruvalcaba DO  Dx:   Encounter Diagnosis     ICD-10-CM    1. Acute low back pain, unspecified back pain laterality, unspecified whether sciatica present  M54.50                      Subjective:  Allyn Guevara feels a little tightness upper back today from climbing a ropes course this weekend. Objective: See treatment diary below      Assessment: Tolerated treatment well. She had tightness and tenderness mid and lower T-S parraspinals today. This improved following mobs and STM. Plan: Continue per plan of care. Progress treatment as tolerated.            Eval/ Re-eval Auth #/ Referral # Total visits Start date  Expiration date Total active units  Total manual units  PT only or  PT+OT?   23 IE  99                                   6/5 6/13 6/19 6/26 7/6 7/17 7/24 8/10 8/17 8/21     Total units authorized                Total units remaining                      Precautions:  Standard      Specialty Daily Treatment Diary     Manuals 7/17/23 7/24/23 8/10/23 8/17/23 8/21/23   Visit # 6 7 8 9 10   STM QL paraspinals  3'min 3' 4' 3' 4'   Stretch  Quad psoas HS 3 min 3' 5' 5' 5'   Left up-slip correction Left side   3' PA glides L-S 4'  Gr V to L-S rotation and T-S P to A 3' Dr V  To T-S    4 ' L-S distraction 2' distraction thru leg pull 2' leg pull     Warm-up        Bike 5 min 5 min 5 min 5 min 5 min   Neuro Re-Ed        TA brace w cuff  TA brace w add squeeze, 5# iso hold @ 120° TA brace w add squeeze, 5# iso hold @ 120°  20x iso   Pallof press  Manual resist 5x ea side       Supine march    Legs at 90/90 unilat flex w BTB in supine 20x    Hand knee push 20 w pilates ring 20 w pilates ring  20 w pilates ring            Ther Ex        Mini squat 20  18# kb   20 18#    Side step        Knee ext w df        Clamshell  20  Yellow loop       Child's pose  10x               Ther Activity 1/2 kneel chops   12.5#  CC  20 standing chops                     Modalities        MH

## 2023-08-24 ENCOUNTER — APPOINTMENT (OUTPATIENT)
Dept: PHYSICAL THERAPY | Facility: CLINIC | Age: 28
End: 2023-08-24
Payer: COMMERCIAL

## 2023-08-31 ENCOUNTER — OFFICE VISIT (OUTPATIENT)
Dept: PHYSICAL THERAPY | Facility: CLINIC | Age: 28
End: 2023-08-31
Payer: COMMERCIAL

## 2023-08-31 DIAGNOSIS — M54.50 ACUTE LOW BACK PAIN, UNSPECIFIED BACK PAIN LATERALITY, UNSPECIFIED WHETHER SCIATICA PRESENT: Primary | ICD-10-CM

## 2023-08-31 PROCEDURE — 97110 THERAPEUTIC EXERCISES: CPT | Performed by: PHYSICAL THERAPIST

## 2023-08-31 PROCEDURE — 97140 MANUAL THERAPY 1/> REGIONS: CPT | Performed by: PHYSICAL THERAPIST

## 2023-08-31 NOTE — PROGRESS NOTES
Daily Note     Today's date: 2023  Patient name: Jackie Alejandro  : 1995  MRN: [id number]  Referring provider: Karolina Roque DO  Dx:   Encounter Diagnosis     ICD-10-CM    1. Acute low back pain, unspecified back pain laterality, unspecified whether sciatica present  M54.50                      Subjective:  [place]ia had mild left L-S soreness 2 days ago but was able to relieve it with a heat pack. Objective: See treatment diary below      Assessment: Tolerated treatment well. She had tightness  Mid T-S during Grade V mobs and felt relief following. Plan: Continue per plan of care. Progress treatment as tolerated.            Eval/ Re-eval Auth #/ Referral # Total visits Start date  Expiration date Total active units  Total manual units  PT only or  PT+OT?   23 IE  99                                   6/5 6/13 6/19 6/26 7/6 7/17 7/24 8/10 8/17 8/21 8/31    Total units authorized                Total units remaining                      Precautions:  Standard      Specialty Daily Treatment Diary     Manuals 7/24/23 8/10/23 8/17/23 8/21/23 8/31/23   Visit # 7 8 9 10 11   STM QL paraspinals  3' 4' 3' 4' 4'   Stretch  Quad psoas HS 3' 5' 5' 5' 5'   Left up-slip correction Left side  3' PA glides L-S 4'  Gr V to L-S rotation and T-S P to A 3' Dr V  To T-S Gr V to T-S    2' distraction thru leg pull 2' leg pull   2' leg pull   Warm-up        Bike 5 min 5 min 5 min 5 min 5 min   Neuro Re-Ed        TA brace w cuff TA brace w add squeeze, 5# iso hold @ 120° TA brace w add squeeze, 5# iso hold @ 120°  20x iso Bridges  20x   Pallof press        Supine march   Legs at 90/90 unilat flex w BTB in supine 20x  TA brace arm ext  BTB  20x   Hand knee push 20 w pilates ring  20 w pilates ring          TA brace arm flex  BTB  20x   Ther Ex        Mini squat   20 18#     Side step        Knee ext w df        Clamshell 20  Yellow loop                       Ther Activity        1/2 kneel chops  12.5#  CC  20 standing chops                      Modalities        MH

## 2023-09-06 ENCOUNTER — APPOINTMENT (OUTPATIENT)
Dept: PHYSICAL THERAPY | Facility: CLINIC | Age: 28
End: 2023-09-06
Payer: COMMERCIAL

## 2023-09-07 ENCOUNTER — APPOINTMENT (OUTPATIENT)
Dept: PHYSICAL THERAPY | Facility: CLINIC | Age: 28
End: 2023-09-07
Payer: COMMERCIAL

## 2023-09-14 ENCOUNTER — APPOINTMENT (OUTPATIENT)
Dept: PHYSICAL THERAPY | Facility: CLINIC | Age: 28
End: 2023-09-14
Payer: COMMERCIAL

## 2023-09-21 ENCOUNTER — APPOINTMENT (OUTPATIENT)
Dept: PHYSICAL THERAPY | Facility: CLINIC | Age: 28
End: 2023-09-21
Payer: COMMERCIAL

## 2023-09-21 ENCOUNTER — OFFICE VISIT (OUTPATIENT)
Dept: PHYSICAL THERAPY | Facility: CLINIC | Age: 28
End: 2023-09-21
Payer: COMMERCIAL

## 2023-09-21 DIAGNOSIS — M54.50 ACUTE LOW BACK PAIN, UNSPECIFIED BACK PAIN LATERALITY, UNSPECIFIED WHETHER SCIATICA PRESENT: Primary | ICD-10-CM

## 2023-09-21 PROCEDURE — 97140 MANUAL THERAPY 1/> REGIONS: CPT | Performed by: PHYSICAL THERAPIST

## 2023-09-21 NOTE — PROGRESS NOTES
Daily Note     Today's date: 2023  Patient name: James Wood  : 1995  MRN: [id number]  Referring provider: [place] Aleman DO  Dx:   Encounter Diagnosis     ICD-10-CM    1. Acute low back pain, unspecified back pain laterality, unspecified whether sciatica present  M54.50                      Subjective:  [place] reports that her back has felt good recently. She did aggravate it over the weekend by doing housework. Objective: See treatment diary below      Assessment: Tolerated treatment well. She feels relief with Gr V T-S mobs and L-S rotation mobs      Plan: Continue per plan of care. Progress treatment as tolerated.            Eval/ Re-eval Auth #/ Referral # Total visits Start date  Expiration date Total active units  Total manual units  PT only or  PT+OT?   23 IE  99                                   6/5 6/13 6/19 6/26 7/6 7/17 7/24 8/10 8/17 8/21 8/31 9/21   Total units authorized                Total units remaining                      Precautions:  Standard      Specialty Daily Treatment Diary     Manuals 8/10/23 8/17/23 8/21/23 8/31/23 9/212/23   Visit # 8 9 10 11 12   STM QL paraspinals  4' 3' 4' 4' 6'   Stretch  Quad psoas HS 5' 5' 5' 5' 5'   Left up-slip correction Left side 3' PA glides L-S 4'  Gr V to L-S rotation and T-S P to A 3' Dr V  To T-S Gr V to T-S GR V to T-S    2' leg pull   2' leg pull 2' leg pull   Warm-up        Bike 5 min 5 min 5 min 5 min 5 min   Neuro Re-Ed        TA brace w cuff TA brace w add squeeze, 5# iso hold @ 120°  20x iso Bridges  20x Bridges  20   Pallof press        Supine march  Legs at 90/90 unilat flex w BTB in supine 20x  TA brace arm ext  BTB  20x    Hand knee push  20 w pilates ring          TA brace arm flex  BTB  20x    Ther Ex        Mini squat  20 18#      Side step        Knee ext w df        Clamshell                        Ther Activity        1/2 kneel chops 12.5#  CC  20 standing chops                       Modalities        MH     5 min MH in prone

## 2023-09-27 ENCOUNTER — APPOINTMENT (OUTPATIENT)
Dept: PHYSICAL THERAPY | Facility: CLINIC | Age: 28
End: 2023-09-27
Payer: COMMERCIAL

## 2023-10-05 ENCOUNTER — APPOINTMENT (OUTPATIENT)
Dept: PHYSICAL THERAPY | Facility: CLINIC | Age: 28
End: 2023-10-05
Payer: COMMERCIAL

## 2023-10-05 ENCOUNTER — OFFICE VISIT (OUTPATIENT)
Dept: PHYSICAL THERAPY | Facility: CLINIC | Age: 28
End: 2023-10-05
Payer: COMMERCIAL

## 2023-10-05 DIAGNOSIS — M54.50 ACUTE LOW BACK PAIN, UNSPECIFIED BACK PAIN LATERALITY, UNSPECIFIED WHETHER SCIATICA PRESENT: Primary | ICD-10-CM

## 2023-10-05 PROCEDURE — 97140 MANUAL THERAPY 1/> REGIONS: CPT | Performed by: PHYSICAL THERAPIST

## 2023-10-05 NOTE — PROGRESS NOTES
Daily Note     Today's date: 10/5/2023  Patient name: Nohelia Curtis  : 1995  MRN: [id number]  Referring provider: Jenny Johnson DO  Dx:   Encounter Diagnosis     ICD-10-CM    1. Acute low back pain, unspecified back pain laterality, unspecified whether sciatica present  M54.50                      Subjective:  Georgia has increased LBP most likely from dancing too much at her wedding this past weekend. Objective: See treatment diary below      Assessment: Tolerated treatment well. She had increased paraspinal and QL tension this session. It released well following grade v's and STM. Plan: Continue per plan of care. Progress treatment as tolerated.            Eval/ Re-eval Auth #/ Referral # Total visits Start date  Expiration date Total active units  Total manual units  PT only or  PT+OT?   23 IE  99                                   6/5 6/13 6/19 6/26 7/6 7/17 7/24 8/10 8/17 8/21 8/31 9/21   Total units authorized                Total units remaining                      Precautions:  Standard      Specialty Daily Treatment Diary     Manuals 8/17/23 8/21/23 8/31/23 9/212/23 10/5/23   Visit # 9 10 11 12 13   STM QL paraspinals  3' 4' 4' 6' 5'   Stretch  Quad psoas HS 5' 5' 5' 5' 3'   Left up-slip correction Left side 4'  Gr V to L-S rotation and T-S P to A 3' Dr V  To T-S Gr V to T-S GR V to T-S GR V T-S and L-S rot      2' leg pull 2' leg pull 3' leg pull   Warm-up        Bike 5 min 5 min 5 min 5 min 5 min   Neuro Re-Ed        TA brace w cuff  20x iso Bridges  20x Bridges  20    Pallof press        Supine march Legs at 90/90 unilat flex w BTB in supine 20x  TA brace arm ext  BTB  20x     Hand knee push 20 w pilates ring          TA brace arm flex  BTB  20x     Ther Ex        Mini squat 20 18#       Side step        Knee ext w df        Clamshell                        Ther Activity        1/2 kneel chops                        Modalities        MH    5 min MH in prone

## 2023-10-12 ENCOUNTER — APPOINTMENT (OUTPATIENT)
Dept: PHYSICAL THERAPY | Facility: CLINIC | Age: 28
End: 2023-10-12
Payer: COMMERCIAL

## 2023-10-16 DIAGNOSIS — F32.A ANXIETY AND DEPRESSION: ICD-10-CM

## 2023-10-16 DIAGNOSIS — F41.9 ANXIETY AND DEPRESSION: ICD-10-CM

## 2023-10-16 RX ORDER — CITALOPRAM 40 MG/1
40 TABLET ORAL DAILY
Qty: 30 TABLET | Refills: 5 | Status: SHIPPED | OUTPATIENT
Start: 2023-10-16

## 2023-10-18 ENCOUNTER — APPOINTMENT (OUTPATIENT)
Dept: PHYSICAL THERAPY | Facility: CLINIC | Age: 28
End: 2023-10-18
Payer: COMMERCIAL

## 2023-10-19 ENCOUNTER — APPOINTMENT (OUTPATIENT)
Dept: PHYSICAL THERAPY | Facility: CLINIC | Age: 28
End: 2023-10-19
Payer: COMMERCIAL

## 2023-10-26 ENCOUNTER — APPOINTMENT (OUTPATIENT)
Dept: PHYSICAL THERAPY | Facility: CLINIC | Age: 28
End: 2023-10-26
Payer: COMMERCIAL

## 2023-10-26 ENCOUNTER — OFFICE VISIT (OUTPATIENT)
Dept: PHYSICAL THERAPY | Facility: CLINIC | Age: 28
End: 2023-10-26
Payer: COMMERCIAL

## 2023-10-26 DIAGNOSIS — M54.50 ACUTE LOW BACK PAIN, UNSPECIFIED BACK PAIN LATERALITY, UNSPECIFIED WHETHER SCIATICA PRESENT: Primary | ICD-10-CM

## 2023-10-26 PROCEDURE — 97140 MANUAL THERAPY 1/> REGIONS: CPT | Performed by: PHYSICAL THERAPIST

## 2023-10-26 NOTE — PROGRESS NOTES
Daily Note     Today's date: 10/26/2023  Patient name: Waqar Lo  : 1995  MRN: [id number]  Referring provider: Monty DO  Dx:   Encounter Diagnosis     ICD-10-CM    1. Acute low back pain, unspecified back pain laterality, unspecified whether sciatica present  M54.50                      Subjective:  [place] reports tightness L-S and to a lesser extent T-S      Objective: See treatment diary below      Assessment: Tolerated treatment well. She felt improved with grade V mobs to T-S and L-S. Plan: Continue per plan of care. Progress treatment as tolerated.            Eval/ Re-eval Auth #/ Referral # Total visits Start date  Expiration date Total active units  Total manual units  PT only or  PT+OT?   23 IE  99                                   6/5 6/13 6/19 6/26 7/6 7/17 7/24 8/10 8/17 8/21 8/31 9/21   Total units authorized                Total units remaining                     10/5 10/26             Total units authorized                Total units remaining                    Precautions:  Standard      Specialty Daily Treatment Diary     Manuals 8/21/23 8/31/23 9/212/23 10/5/23 10/26/23   Visit # 10 11 12 13 14   STM QL paraspinals  4' 4' 6' 5' 6'   Stretch  Quad psoas HS 5' 5' 5' 3' 6' psoas and HS   Left up-slip correction Left side 3' Dr V  To T-S Gr V to T-S GR V to T-S GR V T-S and L-S rot GR V T-S and L-S rot     2' leg pull 2' leg pull 3' leg pull 3'   Warm-up        Bike 5 min 5 min 5 min 5 min    Neuro Re-Ed        TA brace w cuff 20x iso Bridges  20x Bridges  20     Pallof press        Supine march  TA brace arm ext  BTB  20x      Hand knee push          TA brace arm flex  BTB  20x      Ther Ex        Mini squat        Side step        Knee ext w df        Clamshell                        Ther Activity        1/2 kneel chops                        Modalities        MH   5 min MH in prone

## 2023-11-02 ENCOUNTER — APPOINTMENT (OUTPATIENT)
Dept: PHYSICAL THERAPY | Facility: CLINIC | Age: 28
End: 2023-11-02
Payer: COMMERCIAL

## 2023-11-06 ENCOUNTER — OFFICE VISIT (OUTPATIENT)
Dept: PHYSICAL THERAPY | Facility: CLINIC | Age: 28
End: 2023-11-06
Payer: COMMERCIAL

## 2023-11-06 DIAGNOSIS — M54.50 ACUTE LOW BACK PAIN, UNSPECIFIED BACK PAIN LATERALITY, UNSPECIFIED WHETHER SCIATICA PRESENT: Primary | ICD-10-CM

## 2023-11-06 PROCEDURE — 97140 MANUAL THERAPY 1/> REGIONS: CPT | Performed by: PHYSICAL THERAPIST

## 2023-11-06 NOTE — PROGRESS NOTES
Daily Note     Today's date: 2023  Patient name: Rayne Willis  : 1995  MRN: [id number]  Referring provider: Tavia Estrada DO  Dx:   Encounter Diagnosis     ICD-10-CM    1. Acute low back pain, unspecified back pain laterality, unspecified whether sciatica present  M54.50                      Subjective:  [place] reports feeling pretty good. She would like to stop PT today to focus on other health concerns. Objective: See treatment diary below      Assessment: Tolerated treatment well. She has achieved all personal goals. Plan:  Discharge at this time.        Eval/ Re-eval Auth #/ Referral # Total visits Start date  Expiration date Total active units  Total manual units  PT only or  PT+OT?   23 IE  99                                   6/5 6/13 6/19 6/26 7/6 7/17 7/24 8/10 8/17 8/21 8/31 9/21   Total units authorized                Total units remaining                     10/5 10/26 11/6            Total units authorized                Total units remaining                    Precautions:  Standard      Specialty Daily Treatment Diary     Manuals 8/31/23 9/212/23 10/5/23 10/26/23 11/6/23   Visit # 11 12 13 14 15   STM QL paraspinals  4' 6' 5' 6' 6'   Stretch  Quad psoas HS 5' 5' 3' 6' psoas and HS 6'   Left up-slip correction Left side Gr V to T-S GR V to T-S GR V T-S and L-S rot GR V T-S and L-S rot     2' leg pull 2' leg pull 3' leg pull 3' 2'   Warm-up        Bike 5 min 5 min 5 min  5 min   Neuro Re-Ed        TA brace w cuff Bridges  20x Bridges  20      Pallof press        Supine march TA brace arm ext  BTB  20x       Hand knee push         TA brace arm flex  BTB  20x       Ther Ex        Mini squat        Side step        Knee ext w df        Clamshell                        Ther Activity        1/2 kneel chops                        Modalities        MH  5 min MH in prone   8 min in prone

## 2023-11-14 ENCOUNTER — OFFICE VISIT (OUTPATIENT)
Dept: PHYSICAL THERAPY | Facility: CLINIC | Age: 28
End: 2023-11-14
Payer: COMMERCIAL

## 2023-11-14 DIAGNOSIS — M54.50 CHRONIC BILATERAL LOW BACK PAIN WITHOUT SCIATICA: Primary | ICD-10-CM

## 2023-11-14 DIAGNOSIS — G89.29 CHRONIC BILATERAL LOW BACK PAIN WITHOUT SCIATICA: Primary | ICD-10-CM

## 2023-11-14 DIAGNOSIS — R10.2 PELVIC PAIN: ICD-10-CM

## 2023-11-14 PROCEDURE — 97112 NEUROMUSCULAR REEDUCATION: CPT

## 2023-11-14 PROCEDURE — 97162 PT EVAL MOD COMPLEX 30 MIN: CPT

## 2023-11-14 NOTE — PROGRESS NOTES
PT Evaluation     Today's date: 2023  Patient name: Christiane Babin  : 1995  MRN: [id number]  Referring provider: Jamie Whitley DO  Dx:   Encounter Diagnosis     ICD-10-CM    1. Chronic bilateral low back pain without sciatica  M54.50     G89.29       2. Pelvic pain  R10.2                      Assessment  Assessment details:   CASE SUMMARY:   Christiane Babin is a 29y.o. year old female who reports onset of symptoms ~  chronic low back pain, pain in pelvis, and first trimester pregnancy. Patient describes symptoms as: painful, worrisome, and annoying. Symptoms are : intermittent. [place] is limited in the following activities: sitting, standing for periods of time and sleeping through the night without pain. PMHx includes: See chart for full details with medications. Patient's clinical presentation is consistent with their referring diagnosis of: No diagnosis found. Lupe Vickers POC was discussed and agreed upon with patient. Patient was educated on: pelvic floor anatomy, Importance of body mechanics and ergonomics in regards to protecting against activities which increase IAP and pressure,  and PT exam and course of treatment. Patient vocalized a good understanding of  POC and HEP issued.  Patient would benefit from skilled physical therapy services to address their aforementioned functional limitations and progress towards prior level of function and independence with home exercise program.      Pelvic floor verbal consent and written consent signed and in chart- deferred until 2nd trimester  Patient deferred second person in room: YES/NO         Impairments: abnormal muscle firing, abnormal muscle tone, activity intolerance, impaired physical strength, lacks appropriate home exercise program, poor posture  and poor body mechanics  Understanding of Dx/Px/POC: good   Prognosis: good    Goals  STG (3 weeks)  Patient will be independent with HEP  Patient will demonstrate ability to properly fill out bowel/ bladder log  Patient will self report sxs decrease by 25%  Patient will demonstrate the ability to perform kegel and downtraining    LTG (8 weeks)  Patient will be independent in comprehensive HEP  Patient will improve pelvic health metric by [facility]  Patient will self report sxs decrease by 75%  Patient will demonstrate the ability to perform sustained kegels in functional positioning     Plan  Patient would benefit from: skilled physical therapy  Planned modality interventions: biofeedback, cryotherapy, TENS, ultrasound and hydrotherapy  Planned therapy interventions: joint mobilization, manual therapy, neuromuscular re-education, patient education, postural training, strengthening, stretching, therapeutic activities, therapeutic exercise, functional ROM exercises, flexibility, graded activity, home exercise program, abdominal trunk stabilization and breathing training  Frequency: 1x week  Duration in weeks: 10  Plan of Care beginning date: 11/14/2023  Plan of Care expiration date: 1/23/2024  Treatment plan discussed with: patient      PT Pelvic Floor Subjective:   History of Present Illness:   LBP started 10 years ago, couldn't even tie shoes. Used to lift heavy. More when she is doing extension based stuff. Some days it is fine, some days it really hurts. Upper back into hips. Is now 5 weeks pregnant.          Recurrent probem    Quality of life: good    Social Support:     Lives in:  Multiple-level home    Lives with:  Spouse    Relationship status: /committed    Work status: employed full time    Life stress level: 6    Life stress severity: moderate    some medication for anxietyPronouns: she/her  Hand dominance:  Right  Diet and Exercise:      Exercise type: walking  OB/ gyn History    Gestational History:     Prior Pregnancy: No      Delivery Complications:  Currently 5 weeks pregnant with first child    Menstrual History:    Date of last menstrual period: 10/10/2023    Menstrual irregularities regular menses    Painful periods:  Difficulty managing menstrual pain    Birth control: no contraception  Bladder Function:     Voiding Difficulties positive for: urgency and frequent urination       Voiding Difficulties comments:     Voiding frequency: every 31-60 minutes and every 1-2 hours    Urinary leakage: no urine leakage    Nocturia (episodes per night): 0 and 1    Painful urination: No      Intake (ounces): Water: 60, Coffee: 12,   Incontinence Management:     Pads/Diaper Use:  None  Bowel Function:     Voiding DIfficulties: constipation      Bowel Function comments:  Hx of constipation but it has gotten better with pregnancy    Bowel frequency: multiple times a day    [place] Stool Scale: type 2 and type 3    Uses "squatty potty": Squatty Potty  Sexual Function:     Sexually Active:  Sexually active    Pain during intercourse: No    Pain:     No pain reported by patient. Objective     Concurrent Complaints      Additional Special Questions  5/5 strength for entire lower quarter    Postural Observations  Seated posture: fair      Tenderness     Left Hip   Tenderness in the PSIS. Right Hip   Tenderness in the PSIS.      Additional Tenderness Details  Positive for forward flexion and supine to sit test for anteriorly rotated right innominate- responded well to posterior innominate thrust.     Active Range of Motion   Cervical/Thoracic Spine       Thoracic    Flexion:  WFL  Left lateral flexion:  WFL  Left rotation:  [facility]    Lumbar   Flexion:  WFL  Left lateral flexion:  WFL  Left rotation:  [facility]    Strength/Myotome Testing     Left Hip   Planes of Motion   Flexion: 5  Extension: 5  Abduction: 5  Adduction: 5  External rotation: 5  Internal rotation: 5    Right Hip   Planes of Motion   Flexion: 5  Extension: 5  Abduction: 5  Adduction: 5  External rotation: 5  Internal rotation: 5    Left Knee   Flexion: 5  Extension: 5    Right Knee   Flexion: 5  Extension: 5    Left Ankle/Foot   Dorsiflexion: 5  Plantar flexion: 5    Right Ankle/Foot   Dorsiflexion: 5  Plantar flexion: 5    Tests     Left Hip   Positive long sit.      General Comments:    Lower quarter screen   Hips: unremarkable  Knees: unremarkable  Foot/ankle: unremarkable      Abdominal Assessment:      Abdominal Assessment: Pelvic floor exam deferred until second trimester    Diastatis   Diastasis recti present? no       Graphical documentation:                  Precautions: first trimester      Date: 11/14/23         Session: IE         Manuals           Left posterior innominate thrust Gr V                                       Neuro Re-Ed          360 breathing          Diaphragmatic breathing          Pelvic tilts          TA engagement          TA SLR HEP         clamshells HEP                                                           Ther Ex          Standing extension          Standing row          Standing hip ABD/ EXT                                                            Ther Activity                              Gait Training                              Modalities

## 2023-11-17 ENCOUNTER — OFFICE VISIT (OUTPATIENT)
Dept: FAMILY MEDICINE CLINIC | Facility: CLINIC | Age: 28
End: 2023-11-17
Payer: COMMERCIAL

## 2023-11-17 VITALS
HEIGHT: 64 IN | OXYGEN SATURATION: 98 % | HEART RATE: 87 BPM | TEMPERATURE: 97.4 F | DIASTOLIC BLOOD PRESSURE: 64 MMHG | SYSTOLIC BLOOD PRESSURE: 114 MMHG | WEIGHT: 192 LBS | BODY MASS INDEX: 32.78 KG/M2

## 2023-11-17 DIAGNOSIS — F32.A ANXIETY AND DEPRESSION: ICD-10-CM

## 2023-11-17 DIAGNOSIS — F41.9 ANXIETY AND DEPRESSION: ICD-10-CM

## 2023-11-17 DIAGNOSIS — Z00.00 WELL ADULT EXAM: Primary | ICD-10-CM

## 2023-11-17 DIAGNOSIS — Z3A.01 LESS THAN 8 WEEKS GESTATION OF PREGNANCY: ICD-10-CM

## 2023-11-17 PROCEDURE — 99395 PREV VISIT EST AGE 18-39: CPT | Performed by: FAMILY MEDICINE

## 2023-11-17 NOTE — PROGRESS NOTES
Assessment/Plan:  Well adult exam.  Pregnancy approximately 8 weeks. Patient has a scheduled OB appointment for next month. Patient was asked to call her OB/GYN's office to get orders for blood work and a potential ultrasound. The patient will continue her prenatal vitamins. The patient is up-to-date with flu vaccinations for this year. History of anxiety and depression. The patient can continue her Celexa as prescribed in the past.           Subjective:      Patient ID: Khadijah Sethi is a 29 y.o. female. This is a 49-year-old female who presents today stating that she did a home pregnancy test and it was positive about 2 weeks ago. The patient states some symptoms of morning sick sickness in the last week. She also describes some mild cramping on her right side this morning. She denies any problems with vaginal bleeding. Her last menstrual period was 10/10/2023. The patient does have a gynecological appointment scheduled for next month. The patient will be using [facility] OB at [facility]        Review of Systems   Constitutional: Negative. HENT: Negative. Eyes: Negative. Respiratory: Negative. Mild wheezes   Cardiovascular: Negative. Gastrointestinal: Negative. Endocrine: Negative. Genitourinary: Negative. Musculoskeletal: Negative. Skin: Negative. Allergic/Immunologic: Negative. Neurological: Negative. Hematological: Negative. Psychiatric/Behavioral: Negative. Objective:      /64 (BP Location: Left arm, Patient Position: Sitting, Cuff Size: Standard)   Pulse 87   Temp (!) 97.4 °F (36.3 °C) (Tympanic)   Ht 5' 4" (1.626 m)   Wt 87.1 kg (192 lb)   LMP 10/10/2023 (Exact Date)   SpO2 98%   BMI 32.96 kg/m²          Physical Exam  Constitutional:       Comments: Patient's BMI is 32.96 and mildly obese. HENT:      Head: Normocephalic and atraumatic.       Mouth/Throat:      Mouth: Mucous membranes are moist.   Cardiovascular: Rate and Rhythm: Normal rate and regular rhythm. Heart sounds: Normal heart sounds. Pulmonary:      Effort: Pulmonary effort is normal.      Breath sounds: Normal breath sounds. Musculoskeletal:         General: Normal range of motion. Cervical back: Normal range of motion. Neurological:      General: No focal deficit present. Mental Status: She is alert and oriented to person, place, and time. Mental status is at baseline. Psychiatric:         Mood and Affect: Mood normal.         Behavior: Behavior normal.         Thought Content:  Thought content normal.         Judgment: Judgment normal.

## 2023-11-20 ENCOUNTER — APPOINTMENT (OUTPATIENT)
Dept: PHYSICAL THERAPY | Facility: CLINIC | Age: 28
End: 2023-11-20
Payer: COMMERCIAL

## 2023-11-21 ENCOUNTER — TELEPHONE (OUTPATIENT)
Dept: FAMILY MEDICINE CLINIC | Facility: CLINIC | Age: 28
End: 2023-11-21

## 2023-11-21 NOTE — TELEPHONE ENCOUNTER
Pt has + pregnancy test and completed PRA. Called to confirm if she will be receiving prenatal care thru [facility] or [place] (her GYN) Reached . Left msg.

## 2023-11-24 ENCOUNTER — APPOINTMENT (OUTPATIENT)
Dept: PHYSICAL THERAPY | Facility: CLINIC | Age: 28
End: 2023-11-24
Payer: COMMERCIAL

## 2023-11-27 ENCOUNTER — APPOINTMENT (OUTPATIENT)
Dept: PHYSICAL THERAPY | Facility: CLINIC | Age: 28
End: 2023-11-27
Payer: COMMERCIAL

## 2023-11-28 DIAGNOSIS — O21.0 MORNING SICKNESS: Primary | ICD-10-CM

## 2023-11-28 RX ORDER — ONDANSETRON 4 MG/1
TABLET, FILM COATED ORAL
Qty: 20 TABLET | Refills: 0 | Status: SHIPPED | OUTPATIENT
Start: 2023-11-28

## 2023-11-28 NOTE — TELEPHONE ENCOUNTER
Spoke with pt - she stated that she will be getting prenatal care through [facility].

## 2023-11-30 ENCOUNTER — OFFICE VISIT (OUTPATIENT)
Dept: PHYSICAL THERAPY | Facility: CLINIC | Age: 28
End: 2023-11-30
Payer: COMMERCIAL

## 2023-11-30 ENCOUNTER — OFFICE VISIT (OUTPATIENT)
Dept: OBGYN CLINIC | Facility: CLINIC | Age: 28
End: 2023-11-30
Payer: COMMERCIAL

## 2023-11-30 VITALS
SYSTOLIC BLOOD PRESSURE: 120 MMHG | WEIGHT: 192 LBS | BODY MASS INDEX: 32.78 KG/M2 | HEIGHT: 64 IN | DIASTOLIC BLOOD PRESSURE: 74 MMHG

## 2023-11-30 DIAGNOSIS — R10.2 PELVIC PAIN: ICD-10-CM

## 2023-11-30 DIAGNOSIS — O30.041 DICHORIONIC DIAMNIOTIC TWIN PREGNANCY IN FIRST TRIMESTER: ICD-10-CM

## 2023-11-30 DIAGNOSIS — M54.50 ACUTE LOW BACK PAIN, UNSPECIFIED BACK PAIN LATERALITY, UNSPECIFIED WHETHER SCIATICA PRESENT: ICD-10-CM

## 2023-11-30 DIAGNOSIS — G89.29 CHRONIC BILATERAL LOW BACK PAIN WITHOUT SCIATICA: Primary | ICD-10-CM

## 2023-11-30 DIAGNOSIS — N91.2 AMENORRHEA: Primary | ICD-10-CM

## 2023-11-30 DIAGNOSIS — M54.50 CHRONIC BILATERAL LOW BACK PAIN WITHOUT SCIATICA: Primary | ICD-10-CM

## 2023-11-30 PROCEDURE — 99214 OFFICE O/P EST MOD 30 MIN: CPT | Performed by: OBSTETRICS & GYNECOLOGY

## 2023-11-30 PROCEDURE — 97112 NEUROMUSCULAR REEDUCATION: CPT

## 2023-11-30 PROCEDURE — 97140 MANUAL THERAPY 1/> REGIONS: CPT

## 2023-11-30 PROCEDURE — 76817 TRANSVAGINAL US OBSTETRIC: CPT | Performed by: OBSTETRICS & GYNECOLOGY

## 2023-11-30 NOTE — PROGRESS NOTES
Daily Note     Today's date: 2023  Patient name: Aaron Hdez  : 1995  MRN: [id number]  Referring provider: Tj Wu DO  Dx:   Encounter Diagnosis     ICD-10-CM    1. Chronic bilateral low back pain without sciatica  M54.50     G89.29       2. Acute low back pain, unspecified back pain laterality, unspecified whether sciatica present  M54.50       3. Pelvic pain  R10.2           Start Time: 1400  Stop Time: 1500  Total time in clinic (min): 60 minutes    Subjective: Just came back from OB and is having twins      Objective: See treatment diary below      Assessment: Tolerated treatment well. Patient would benefit from continued PT in order to build core and postural strength to support growing abdomin. Demonstrated the ability to perform TA contraction. Did well with all exercises. Plan: Continue per plan of care. Add seated work on [facility]:  AMA/CMS Eval/ Re-eval POC expires Benedicto Rodroy #/ Referral # Total    Start date  Expiration date Extension  Visit limitation? PT only or  PT+OT?  Co-Insurance   CMS 23                                                                         AUTH #:  Date               Authed: Used                Remaining                   Precautions: first trimester      Date: 23        Session: IE 2        Manuals           Left posterior innominate thrust Gr V TPR b/l glutes, erector spinae                                      Neuro Re-Ed          360 breathing  performed        Diaphragmatic breathing          Pelvic tilts          TA engagement          TA SLR HEP 2*10 b/l         clamshells HEP         bridges  With ADD squeeze 20x                                                Ther Ex          Standing extension  2*10 BTB        Standing row  2*10 BTB        Standing hip ABD/ EXT          Paloff press   15x b/l                                                Ther Activity                              Gait Training Modalities

## 2023-11-30 NOTE — PROGRESS NOTES
Assessment/Plan:  Diagnoses and all orders for this visit:    Amenorrhea  -     Ambulatory Referral to Maternal Fetal Medicine; Future  -     AMB US OB < 14 weeks single or first gestation level 1    Dichorionic diamniotic twin pregnancy in first trimester  -     Ambulatory Referral to Maternal Fetal Medicine; Future    Other orders  -     Fxvszo-Jbfelkars-Idal-Fish Oil (PRENATAL + COMPLETE MULTI PO); Take by mouth        - Viable di di twin IUP @ 7w 6d EGA  - RUDY 2024 by LMP  - Continue PNV  - Patient to call for concerns  - RTO 3 weeks for OB intake                      Subjective:       Patient ID: Daniel 1995        Daniel is a 29 y.o. Pollyann Pin presenting to the office for pregnancy confirmation. Patient's last menstrual period was 10/06/2023 (exact date). , placing her at   [facility] today with RUDY of 2024.  She is feeling well        OB History    Para Term  AB Living   1 0 0 0 0 0   SAB IAB Ectopic Multiple Live Births   0 0 0 0 0      # Outcome Date GA Lbr Tony/2nd Weight Sex Delivery Anes PTL Lv   1 Current               Obstetric Comments   Menarche 15         The following portions of the patient's history were reviewed and updated as appropriate: allergies, current medications, past family history, past medical history, past social history, past surgical history, and problem list.    Allergies:  Sulfa antibiotics    Medications:    Current Outpatient Medications:     citalopram (CeleXA) 40 mg tablet, Take 1 tablet (40 mg total) by mouth daily, Disp: 30 tablet, Rfl: 5    ondansetron (ZOFRAN) 4 mg tablet, One tab q 8 hours prn Nausea or vomiting associated with morning sickness, Disp: 20 tablet, Rfl: 0    Niasxc-Tpcqiqpug-Dnla-Fish Oil (PRENATAL + COMPLETE MULTI PO), Take by mouth, Disp: , Rfl:       Review of Systems:   Review of Systems       Objective:       Visit Vitals  /74   Ht 5' 4" (1.626 m)   Wt 87.1 kg (192 lb)   LMP 10/06/2023 (Exact Date)   BMI 32.96 kg/m² OB Status Pregnant   Smoking Status Never   BSA 1.92 m²        GEN: The patient was alert and oriented x3, pleasant well-appearing female in no acute distress. CV: Regular rate  PULM: nonlabored respirations  MSK: Normal gait  : WNL  Skin: warm, dry  Neuro: no focal deficits  Psych: normal affect and judgement, cooperative    Ultrasound:     Viability US     Gestational sac: present x2               Location: uterine  Yolk sac: present x2  Fetal pole: present x2                  A - CRL: 1.05 cm = 7w1d  Cardiac activity: present               Rate: 138 bpm      B - CRL:  1.23 cm = 7w3d  Cardiac activity: present   Rate: 146bpm    Ovaries: normal appearing bilaterally  Cul de sac: absence of free fluid  Uterus: normal in appearance           Ultrasound Probe Disinfection    A transvaginal ultrasound was performed.    Prior to use, disinfection was performed with High Level Disinfection Process (Trophon)  Probe serial number [id number]: [id number] was used    Sandra Cowan MD  11/30/23  1:13 PM

## 2023-12-04 ENCOUNTER — APPOINTMENT (OUTPATIENT)
Dept: PHYSICAL THERAPY | Facility: CLINIC | Age: 28
End: 2023-12-04
Payer: COMMERCIAL

## 2023-12-11 ENCOUNTER — APPOINTMENT (OUTPATIENT)
Dept: PHYSICAL THERAPY | Facility: CLINIC | Age: 28
End: 2023-12-11
Payer: COMMERCIAL

## 2023-12-13 DIAGNOSIS — O21.0 MORNING SICKNESS: ICD-10-CM

## 2023-12-13 RX ORDER — ONDANSETRON 4 MG/1
TABLET, FILM COATED ORAL
Qty: 20 TABLET | Refills: 0 | Status: SHIPPED | OUTPATIENT
Start: 2023-12-13

## 2023-12-14 ENCOUNTER — OFFICE VISIT (OUTPATIENT)
Dept: PHYSICAL THERAPY | Facility: CLINIC | Age: 28
End: 2023-12-14
Payer: COMMERCIAL

## 2023-12-14 DIAGNOSIS — K59.00 CONSTIPATION, UNSPECIFIED CONSTIPATION TYPE: ICD-10-CM

## 2023-12-14 DIAGNOSIS — M54.50 ACUTE LOW BACK PAIN, UNSPECIFIED BACK PAIN LATERALITY, UNSPECIFIED WHETHER SCIATICA PRESENT: ICD-10-CM

## 2023-12-14 DIAGNOSIS — G89.29 CHRONIC BILATERAL LOW BACK PAIN WITHOUT SCIATICA: Primary | ICD-10-CM

## 2023-12-14 DIAGNOSIS — M54.50 CHRONIC BILATERAL LOW BACK PAIN WITHOUT SCIATICA: Primary | ICD-10-CM

## 2023-12-14 DIAGNOSIS — R10.2 PELVIC PAIN: ICD-10-CM

## 2023-12-14 PROCEDURE — 97112 NEUROMUSCULAR REEDUCATION: CPT

## 2023-12-14 PROCEDURE — 97140 MANUAL THERAPY 1/> REGIONS: CPT

## 2023-12-14 NOTE — PROGRESS NOTES
Daily Note     Today's date: 2023  Patient name: Bal Hayward  : 1995  MRN: [id number]  Referring provider: Scarlett Kirkland DO  Dx:   Encounter Diagnosis     ICD-10-CM    1. Chronic bilateral low back pain without sciatica  M54.50     G89.29       2. Acute low back pain, unspecified back pain laterality, unspecified whether sciatica present  M54.50       3. Pelvic pain  R10.2       4. Constipation, unspecified constipation type  K59.00           Start Time: 1400  Stop Time: 1500  Total time in clinic (min): 60 minutes    Subjective: Lower back has improved, has recently developed constipation. Has always struggled with water intake. Objective: See treatment diary below      Assessment: Tolerated treatment well. Patient would benefit from continued PT in order to manage constipation sxs. Added in core and postural strengthening and pelvic tilts. Continue to progress as tolerated. Plan: Continue per plan of care. Insurance:  AMA/CMS Eval/ Re-eval POC expires Can Morelandus #/ Referral # Total    Start date  Expiration date Extension  Visit limitation? PT only or  PT+OT?  Co-Insurance   CMS 23                                                                         AUTH #:  Date               Authed: Used                Remaining                   Precautions: first trimester      Date: 23       Session: IE 2        Manuals           Left posterior innominate thrust Gr V TPR b/l glutes, erector spinae Stomach massage for increased motility                                     Neuro Re-Ed          360 breathing  performed        Diaphragmatic breathing          Pelvic tilts          TA engagement          TA SLR HEP 2*10 b/l  2*10 b/l       clamshells HEP  2*15 blue loop       bridges  With ADD squeeze 20x With ADD squeeze 20x       Pelvic tilts on ball   P-A, east-west, cw, ccw 20x                                     Ther Ex          Standing extension  2*10 BTB Standing row  2*10 BTB        Standing hip ABD/ EXT   2*10 blue loop       Paloff press   15x b/l                                                Ther Activity                              Gait Training                              Modalities

## 2023-12-18 ENCOUNTER — APPOINTMENT (OUTPATIENT)
Dept: PHYSICAL THERAPY | Facility: CLINIC | Age: 28
End: 2023-12-18
Payer: COMMERCIAL

## 2023-12-19 ENCOUNTER — INITIAL PRENATAL (OUTPATIENT)
Dept: OBGYN CLINIC | Facility: CLINIC | Age: 28
End: 2023-12-19

## 2023-12-19 VITALS
HEART RATE: 97 BPM | RESPIRATION RATE: 18 BRPM | HEIGHT: 64 IN | OXYGEN SATURATION: 98 % | WEIGHT: 195.4 LBS | DIASTOLIC BLOOD PRESSURE: 78 MMHG | SYSTOLIC BLOOD PRESSURE: 130 MMHG | BODY MASS INDEX: 33.36 KG/M2

## 2023-12-19 DIAGNOSIS — O30.041 DICHORIONIC DIAMNIOTIC TWIN PREGNANCY IN FIRST TRIMESTER: ICD-10-CM

## 2023-12-19 DIAGNOSIS — Z3A.10 10 WEEKS GESTATION OF PREGNANCY: Primary | ICD-10-CM

## 2023-12-19 PROCEDURE — OBC

## 2023-12-19 NOTE — PROGRESS NOTES
Mark presents for OB intake interview. She is a pleasant 28 year old , pregnant with di / di twins (spontaneously). Mark offers no concerns during today's visit. Patient oriented to practice, different practice providers, different practice locations.  Reviewed expected prenatal visit schedule. Reviewed next scheduled appointment in our office.   Accompanied by her , Gregory.    PLAN-  -Prenatal labs ordered  -Referral given for MFM  -Reviewed Genetic Testing options  -Patient to contact us with any concerns  -RTO for scheduled OB visit      OB History          1    Para   0    Term   0       0    AB   0    Living   0         SAB   0    IAB   0    Ectopic   0    Multiple   0    Live Births   0           Obstetric Comments   Menarche 12               Hx of  delivery prior to 36 weeks 6 days: NO     Last Menstrual Period: Patient's last menstrual period was 10/06/2023 (exact date).  Confirmation ultrasound on 2023: 7w6d       Estimated Date of Delivery: 2024                Signs/Symptoms of Pregnancy                            Breast tenderness:  YES              Fatigue:   YES              Cramping:   NO              Nausea:  YES  Vomiting:   NO     Pregravid BMI: Body mass index is 31.22  Discussed appropriate weight gain in pregnancy based on pre-gravid BMI.     Diabetes              Pregestational DM:  NO              hx of GDM: NO              BMI >35: NO              first degree relative with type 2 diabetes: NO              hx of PCOS: NO              current metformin use: NO              prior hx of LGA/macrosomia: NO                   Hypertension              Hx of chronic HTN: NO              hx of gestational HTN: NO              hx of preeclampsia, eclampsia, or HELLP syndrome: NO              Family h/o preeclampsia: NO              Age 35 or older: NO              Multifetal gestation: YES  Type 1 or Type 2 DM: NO  Renal Disease: NO  Autoimmune disease  (systemic lupus erythematosus, antiphospholipid antibody syndrome): NO  Nulliparity: YES  Obesity (BMI over 30): YES  More than 10 year pregnancy interval: NO  Previous IUGR, low birthweight or small for gestational age: NO        Infection Screening              Does the pt have a hx of MRSA? NO              Recent travel outside of [place]? NO       Immunizations:              Influenza vaccine given today: NO (already received)              Discussed Tdap vaccine administration at 27-28 weeks   COVID vaccine discussed    RSV vaccine discussed     Interview education              [facility] Pregnancy Essentials Book reviewed and discussed                          Handouts given                          Nutrition During Pregnancy  Prenatal Genetic Screening Tests                          Immunization & Pregnancy                          Medications & Pregnancy                          Warning Signs During Pregnancy                          Baby and Me support center                          [facility]  [facility]                          [facility]                          Ultrasounds in Pregnancy    Dental visit with last 6 months - YES  PHQ-2/9 score: 2         Michaelhart activated (not 13-18 years of age)?: YES

## 2023-12-21 ENCOUNTER — OFFICE VISIT (OUTPATIENT)
Dept: PHYSICAL THERAPY | Facility: CLINIC | Age: 28
End: 2023-12-21
Payer: COMMERCIAL

## 2023-12-21 DIAGNOSIS — K59.00 CONSTIPATION, UNSPECIFIED CONSTIPATION TYPE: ICD-10-CM

## 2023-12-21 DIAGNOSIS — G89.29 CHRONIC BILATERAL LOW BACK PAIN WITHOUT SCIATICA: Primary | ICD-10-CM

## 2023-12-21 DIAGNOSIS — M54.50 CHRONIC BILATERAL LOW BACK PAIN WITHOUT SCIATICA: Primary | ICD-10-CM

## 2023-12-21 DIAGNOSIS — R10.2 PELVIC PAIN: ICD-10-CM

## 2023-12-21 PROCEDURE — 97140 MANUAL THERAPY 1/> REGIONS: CPT

## 2023-12-21 PROCEDURE — 97112 NEUROMUSCULAR REEDUCATION: CPT

## 2023-12-21 NOTE — PROGRESS NOTES
Daily Note     Today's date: 2023  Patient name: Mark Velazquez  : 1995  MRN: [id number]  Referring provider: Aidan Conrad DO  Dx:   Encounter Diagnosis     ICD-10-CM    1. Chronic bilateral low back pain without sciatica  M54.50     G89.29       2. Pelvic pain  R10.2       3. Constipation, unspecified constipation type  K59.00           Start Time: 1400  Stop Time: 1500  Total time in clinic (min): 60 minutes    Subjective: Some low back pain, has been able to have consistent bowel movement      Objective: See treatment diary below      Assessment: Tolerated treatment well. Patient would benefit from continued PT in order to manage lower back pain, constipation as she builds TA. Did well with quadraped movements today. Continue to progress core strengthening as tolerated.       Plan: Continue per plan of care.      Insurance:  A/CMS Eval/ Re-eval POC expires FOTO Auth #/ Referral # Total    Start date  Expiration date Extension  Visit limitation?  PT only or  PT+OT? Co-Insurance   CMS 23                                                                         AUTH #:  Date               Authed: Used                Remaining                   Precautions: first trimester      Date: 23      Session: IE 2 3 4      Manuals           Left posterior innominate thrust Gr V TPR b/l glutes, erector spinae Stomach massage for increased motility TPR b/l QL, glutes, erector spinae                                    Neuro Re-Ed          360 breathing  performed        Diaphragmatic breathing          Pelvic tilts          TA engagement          TA SLR HEP 2*10 b/l  2*10 b/l       clamshells HEP  2*15 blue loop 2*15 blue loop      bridges  With ADD squeeze 20x With ADD squeeze 20x Bird dog series 10x b/l      Pelvic tilts on ball   P-A, east-west, cw, ccw 20x P-A, east-west, cw, ccw 20x                    Sit  to stand w ADD squeeze                Ther Ex          Standing  extension  2*10 BTB  Hip burners 10x 5 sec hold, b/l      Standing row  2*10 BTB        Standing hip ABD/ EXT   2*10 blue loop 2*10 blue loop      Paloff press   15x b/l                                                Ther Activity                              Gait Training                              Modalities

## 2023-12-28 ENCOUNTER — APPOINTMENT (OUTPATIENT)
Dept: PHYSICAL THERAPY | Facility: CLINIC | Age: 28
End: 2023-12-28
Payer: COMMERCIAL

## 2024-01-04 ENCOUNTER — OFFICE VISIT (OUTPATIENT)
Dept: PHYSICAL THERAPY | Facility: CLINIC | Age: 29
End: 2024-01-04
Payer: COMMERCIAL

## 2024-01-04 DIAGNOSIS — M54.50 ACUTE LOW BACK PAIN, UNSPECIFIED BACK PAIN LATERALITY, UNSPECIFIED WHETHER SCIATICA PRESENT: ICD-10-CM

## 2024-01-04 DIAGNOSIS — M54.50 CHRONIC BILATERAL LOW BACK PAIN WITHOUT SCIATICA: Primary | ICD-10-CM

## 2024-01-04 DIAGNOSIS — R10.2 PELVIC PAIN: ICD-10-CM

## 2024-01-04 DIAGNOSIS — K59.00 CONSTIPATION, UNSPECIFIED CONSTIPATION TYPE: ICD-10-CM

## 2024-01-04 DIAGNOSIS — G89.29 CHRONIC BILATERAL LOW BACK PAIN WITHOUT SCIATICA: Primary | ICD-10-CM

## 2024-01-04 PROCEDURE — 97112 NEUROMUSCULAR REEDUCATION: CPT

## 2024-01-04 PROCEDURE — 97140 MANUAL THERAPY 1/> REGIONS: CPT

## 2024-01-04 NOTE — PROGRESS NOTES
"Daily Note     Today's date: 2024  Patient name: Mark Velazquez  : 1995  MRN: [id number]  Referring provider: Aidan Conrad DO  Dx:   Encounter Diagnosis     ICD-10-CM    1. Chronic bilateral low back pain without sciatica  M54.50     G89.29       2. Pelvic pain  R10.2       3. Constipation, unspecified constipation type  K59.00       4. Acute low back pain, unspecified back pain laterality, unspecified whether sciatica present  M54.50                      Subjective: \"my right side of my back hurts\", has had recent bowel movement, will start taking stool softner, has been walking on walking pad. Will be 12 weeks on Monday.       Objective: See treatment diary below      Assessment: Tolerated treatment well. Patient demonstrated fatigue post treatment in order to build lower TA strength to support pregnancy. Positive in both forward flexion and supine to sit test for left anteriorly rotated innominate which responded well to anterior thrust with decrease in pain sxs. Verbal and tactile cueing to correct lordosis.       Plan: Continue per plan of care.      Insurance:  A/CMS Eval/ Re-eval POC expires FOTO Auth #/ Referral # Total    Start date  Expiration date Extension  Visit limitation?  PT only or  PT+OT? Co-Insurance   CMS 23                                                                         AUTH #:  Date               Authed: Used                Remaining                   Precautions: first trimester      Date: 23     Session: IE 2 3 4 5     Manuals           Left posterior innominate thrust Gr V TPR b/l glutes, erector spinae Stomach massage for increased motility TPR b/l QL, glutes, erector spinae TPR b/l QL, glutes, erector spinae                                   Neuro Re-Ed          360 breathing  performed        Diaphragmatic breathing          Pelvic tilts          TA engagement          TA SLR HEP 2*10 b/l  2*10 b/l       clamshells HEP  " 2*15 blue loop 2*15 blue loop 2*15 blue loop     bridges  With ADD squeeze 20x With ADD squeeze 20x Bird dog series 10x b/l Bird dog series 10x b/l     Pelvic tilts on ball   P-A, east-west, cw, ccw 20x P-A, east-west, cw, ccw 20x P-A, east-west, cw, ccw 20x                   Sit  to stand w ADD squeeze Sit  to stand w ADD squeeze               Ther Ex          Standing extension  2*10 BTB  Hip burners 10x 5 sec hold, b/l Hip burners 10x 5 sec hold, b/l     Standing row  2*10 BTB        Standing hip ABD/ EXT   2*10 blue loop 2*10 blue loop 2*10 blue loop     Paloff press   15x b/l   Standing arm extension and rows with march 20x                    Paloff rotation b/l 20x ea w                                    Ther Activity                              Gait Training                              Modalities

## 2024-01-05 ENCOUNTER — PATIENT MESSAGE (OUTPATIENT)
Dept: FAMILY MEDICINE CLINIC | Facility: CLINIC | Age: 29
End: 2024-01-05

## 2024-01-05 DIAGNOSIS — O21.0 MORNING SICKNESS: ICD-10-CM

## 2024-01-05 RX ORDER — ONDANSETRON 4 MG/1
TABLET, FILM COATED ORAL
Qty: 20 TABLET | Refills: 0 | Status: SHIPPED | OUTPATIENT
Start: 2024-01-05

## 2024-01-09 ENCOUNTER — TELEPHONE (OUTPATIENT)
Dept: OBGYN CLINIC | Facility: CLINIC | Age: 29
End: 2024-01-09

## 2024-01-09 ENCOUNTER — ROUTINE PRENATAL (OUTPATIENT)
Dept: PERINATAL CARE | Facility: CLINIC | Age: 29
End: 2024-01-09
Payer: COMMERCIAL

## 2024-01-09 VITALS
HEART RATE: 89 BPM | HEIGHT: 64 IN | SYSTOLIC BLOOD PRESSURE: 118 MMHG | DIASTOLIC BLOOD PRESSURE: 70 MMHG | BODY MASS INDEX: 33.32 KG/M2 | WEIGHT: 195.2 LBS

## 2024-01-09 DIAGNOSIS — O30.041 DICHORIONIC DIAMNIOTIC TWIN PREGNANCY IN FIRST TRIMESTER: Primary | ICD-10-CM

## 2024-01-09 DIAGNOSIS — Z36.82 NUCHAL TRANSLUCENCY OF FETUS ON PRENATAL ULTRASOUND: ICD-10-CM

## 2024-01-09 DIAGNOSIS — Z3A.13 13 WEEKS GESTATION OF PREGNANCY: ICD-10-CM

## 2024-01-09 DIAGNOSIS — O99.210 OBESITY IN PREGNANCY, ANTEPARTUM: ICD-10-CM

## 2024-01-09 PROCEDURE — 76814 OB US NUCHAL MEAS ADD-ON: CPT | Performed by: OBSTETRICS & GYNECOLOGY

## 2024-01-09 PROCEDURE — 76801 OB US < 14 WKS SINGLE FETUS: CPT | Performed by: OBSTETRICS & GYNECOLOGY

## 2024-01-09 PROCEDURE — 36415 COLL VENOUS BLD VENIPUNCTURE: CPT | Performed by: OBSTETRICS & GYNECOLOGY

## 2024-01-09 PROCEDURE — 76813 OB US NUCHAL MEAS 1 GEST: CPT | Performed by: OBSTETRICS & GYNECOLOGY

## 2024-01-09 PROCEDURE — 99243 OFF/OP CNSLTJ NEW/EST LOW 30: CPT | Performed by: NURSE PRACTITIONER

## 2024-01-09 PROCEDURE — 76802 OB US < 14 WKS ADDL FETUS: CPT | Performed by: OBSTETRICS & GYNECOLOGY

## 2024-01-09 RX ORDER — ASPIRIN 81 MG/1
162 TABLET ORAL DAILY
Qty: 180 TABLET | Refills: 3 | Status: SHIPPED | OUTPATIENT
Start: 2024-01-09

## 2024-01-09 RX ORDER — ONDANSETRON 4 MG/1
TABLET, FILM COATED ORAL
Qty: 20 TABLET | Refills: 0 | Status: SHIPPED | OUTPATIENT
Start: 2024-01-09

## 2024-01-09 NOTE — PROGRESS NOTES
"Patient chose to have [facility] Non-invasive Prenatal Screen with fetal sex.   Patient choose billed through insurance.   Patient assistance program (PAP) application provided to patient no.  PAP sent with specimen No.     Patient given brochure and is aware [facility] will contact their insurance and coordinate coverage.  Patient made aware she will receive a text message and e-mail from [employer].   Patient informed text/phone call message will come from area code  \"415\".  Provided [employer] Client Services # [phone] and web site at [email or web address].   \"[facility] your test online\" card with barcode and test tube ID provided to patient.   Reviewed [employer]'s web site states 5-7 business days for results via their portal.   [email or web address] message will be sent to patient when [facility] receives results /provider reviews.    2 vials of blood drawn from  right arm by VALENCIA Evangelista [facility].   Patient tolerated blood draw without difficulty.  Specimens labeled with patient identifiers (name, date of birth, specimen collection date), order and specimen were verified with patient, packed and sent via [facility] lab .  FED EX  tracking #  [id number]  Copy of lab order scanned to Epic media.    [facility] will have results in approximately 7-10 business days and will call patient or notify via [facility].  Patient aware viewing lab result online will reveal fetal sex if ordered.    Patient verbalized understanding of all instructions and no questions at this time.   "

## 2024-01-09 NOTE — TELEPHONE ENCOUNTER
I spoke to this patient via avolutiont yesterday regarding an increase in vaginal discharge and odor. She is 13 weeks pregnant. I advised she should be seen to check for an infection. She called the office to make an appointment. Are you able to see her at any point today? She has off all day today

## 2024-01-09 NOTE — LETTER
2024     Sandra Garcia MD   [facility] [address]    Patient: Mark Velazquez   YOB: 1995   Date of Visit: 2024       Dear Dr. Garcia:    Thank you for referring Mark Velazquez to me for evaluation. Below are my notes for this consultation.    If you have questions, please do not hesitate to call me. I look forward to following your patient along with you.         Sincerely,        Jazlyn Donis MD        CC: No Recipients    Jazlyn Donis MD  2024 11:45 AM  Sign when Signing Visit  OFFICE CONSULT      Dear Dr. Garcia,     Thank you for requesting a  consultation on your patient Mark Velazquez for the following indications:  Genetic screening    History  Medications: Prenatal vitamins, Zofran and Celexa  Allergies to medications: Sulfa  Past medical history: Pregravid BMI of 31.22 and anxiety and depression  Past surgical history: Denies  Past obstetrical history:   Social history: Denies current use of alcohol, tobacco or drugs of abuse  First generation family history: Noncontributory    Ultrasound findings: The ultrasound shows a diamniotic dichorionic pregnancy with both fetuses concordant with dates. The nasal bones and nuchal translucencies appear normal. No malformations are seen on today's early ultrasound.     The patient was informed of the findings and counseled about the limitations of the exam in detecting all forms of fetal congenital abnormalities.    She does not report any vaginal bleeding or uterine cramping or contractions.      Specific counseling was provided on the following problems:  1. We discussed the options for genetic screening which include invasive testing on the fetal placenta or on fetal skin cells within the amniotic fluid and compared this to noninvasive testing which includes cell free DNA screening and the sequential screen.  We reviewed the risks, the benefits and the limitations of each.  In the end  patient chose to complete the cell free DNA screen.    2. Twin pregnancy increases several pregnancy risks, including hypertensive disease, gestational diabetes, fetal anomalies, abnormal fetal growth, and  birth. I recommended low dose aspirin (prescribed today), in addition to healthy diet and exercise, to minimize her risk of hypertensive complications. She should have early screening for gestational diabetes (ordered today), and if normal, should be re-screened at 24-28 weeks gestation. A detailed survey of fetal anatomy is recommended at 18-22 weeks gestation. Serial evaluation of twin growth is recommended every 4-6 weeks starting at 28 weeks. Once weekly antepartum fetal surveillance is recommended at 36 weeks gestation. Mean gestational age of spontaneous labor in twin pregnancies is 35-37 weeks. Many twin pregnancies can be delivered vaginally, as long as the patient has no contraindications and provider is skilled in twin vaginal birth.    In the absence of additional obstetric factors, ACOG recommends a delivery time frame of 38 weeks 0 days to 38 weeks 6 days for uncomplicated dichorionic diamniotic twin gestation. However, a 2016 BMJ systematic review and meta analysis (PMID 71699239) suggests delivery be considered at 37 weeks gestation for uncomplicated dichorionic twin pregnancies.    We discussed dietary needs in a twin pregnancy.  She should eat a healthy and balanced diet, and gain weight as suggested by the institute of medicine guidelines (below). In addition to her prenatal vitamin, micronutrient supplementation (or adequate dietary intake) of iron, calcium, magnesium, and zinc are recommended in pregnancy based on expert opinion. (Ike and Will, 2009). For a twin pregnancy, the institute of medicine recommends a gestational weight gain of 31-50lb for overweight women, and 25-42lb in obese women. 150 minutes of excercise per week is advised.     3. We discussed her history of  depression. She is currently using Celexa for support. We discussed that there is abundant safety data on SSRI use in pregnancy. There are significant risks associated with untreated and undertreated depression and other mental illnesses in pregnancy. The [facility] [place] is an additional resource for screening and treatment of depression. Regarding antidepressants in pregnancy, guidelines encourage women who took effective antidepressant regimens prior to pregnancy, to continue with the same treatment regimen. The use of selective serotonin reuptake inhibitors (SSRIs) in pregnancy is also associated with a risk of transient  withdrawal, which is typically mild, self-limited, and does not usually require  intensive care. SSRIs are compatible with breastfeeding (ACOG Practice Bulletin #92, Use of Psychiatric Medications in Pregnancy and Lactation). If desired, neonatology consultation is available in the third trimester to further discuss these  complications. I recommend early postpartum follow-up of her mood, and referral for therapy if needed.    4.  We reviewed current recommendations regarding  Flu, COVID and RSV (third trimester) vaccines by the American College of Obstetricians and Gynecologists and the Society for Maternal-Fetal Medicine. We discussed reassuring pregnancy outcome information after vaccination. We discussed the increased severity of infections and the resultant maternal and fetal complications that can arise with a severe infection including  labor.  Vaccines have been found to generate  antibodies in pregnant and lactating women similar to that observed in non-pregnant women. Vaccine-induced antibody levels were significantly greater than the levels found in response to natural infection. Immune transfer of these antibodies to neonates is found to occur via the placenta and breast milk.    Future tests recommended:  The results of her NIPT will  return in 7-10 days and her OB office will order an MSAFP screen at 16-18 weeks to screen for spina bifida.       Future ultrasounds ordered today:   Fetal Level II ultrasound imaging is recommended at 19-20 weeks' gestation.    Split-shared decision-making between IDALMIS Laureano and myself was utilized, with the majority of the time spent by MANNY Laureano.  Medical decision-making for this encounter was moderate (diagnosis moderate, data moderate and risk moderate).    I reviewed the ultrasound pictures and recommended the medical decision making transcribed in the care of this patient.        Jazlyn Donis MD

## 2024-01-09 NOTE — PROGRESS NOTES
OFFICE CONSULT      Dear Dr. Garcia,     Thank you for requesting a  consultation on your patient Mark Velazquez for the following indications:  Genetic screening    History  Medications: Prenatal vitamins, Zofran and Celexa  Allergies to medications: Sulfa  Past medical history: Pregravid BMI of 31.22 and anxiety and depression  Past surgical history: Denies  Past obstetrical history:   Social history: Denies current use of alcohol, tobacco or drugs of abuse  First generation family history: Noncontributory    Ultrasound findings: The ultrasound shows a diamniotic dichorionic pregnancy with both fetuses concordant with dates. The nasal bones and nuchal translucencies appear normal. No malformations are seen on today's early ultrasound.     The patient was informed of the findings and counseled about the limitations of the exam in detecting all forms of fetal congenital abnormalities.    She does not report any vaginal bleeding or uterine cramping or contractions.      Specific counseling was provided on the following problems:  1. We discussed the options for genetic screening which include invasive testing on the fetal placenta or on fetal skin cells within the amniotic fluid and compared this to noninvasive testing which includes cell free DNA screening and the sequential screen.  We reviewed the risks, the benefits and the limitations of each.  In the end patient chose to complete the cell free DNA screen.    2. Twin pregnancy increases several pregnancy risks, including hypertensive disease, gestational diabetes, fetal anomalies, abnormal fetal growth, and  birth. I recommended low dose aspirin (prescribed today), in addition to healthy diet and exercise, to minimize her risk of hypertensive complications. She should have early screening for gestational diabetes (ordered today), and if normal, should be re-screened at 24-28 weeks gestation. A detailed survey of fetal anatomy is recommended at  18-22 weeks gestation. Serial evaluation of twin growth is recommended every 4-6 weeks starting at 28 weeks. Once weekly antepartum fetal surveillance is recommended at 36 weeks gestation. Mean gestational age of spontaneous labor in twin pregnancies is 35-37 weeks. Many twin pregnancies can be delivered vaginally, as long as the patient has no contraindications and provider is skilled in twin vaginal birth.    In the absence of additional obstetric factors, ACOG recommends a delivery time frame of 38 weeks 0 days to 38 weeks 6 days for uncomplicated dichorionic diamniotic twin gestation. However, a 2016 BMJ systematic review and meta analysis (PMID 29909980) suggests delivery be considered at 37 weeks gestation for uncomplicated dichorionic twin pregnancies.    We discussed dietary needs in a twin pregnancy.  She should eat a healthy and balanced diet, and gain weight as suggested by the institute of medicine guidelines (below). In addition to her prenatal vitamin, micronutrient supplementation (or adequate dietary intake) of iron, calcium, magnesium, and zinc are recommended in pregnancy based on expert opinion. (Ike and Will, 2009). For a twin pregnancy, the institute of medicine recommends a gestational weight gain of 31-50lb for overweight women, and 25-42lb in obese women. 150 minutes of excercise per week is advised.     3. We discussed her history of depression. She is currently using Celexa for support. We discussed that there is abundant safety data on SSRI use in pregnancy. There are significant risks associated with untreated and undertreated depression and other mental illnesses in pregnancy. The [facility] is an additional resource for screening and treatment of depression. Regarding antidepressants in pregnancy, guidelines encourage women who took effective antidepressant regimens prior to pregnancy, to continue with the same treatment regimen. The use of selective serotonin  reuptake inhibitors (SSRIs) in pregnancy is also associated with a risk of transient  withdrawal, which is typically mild, self-limited, and does not usually require  intensive care. SSRIs are compatible with breastfeeding (ACOG Practice Bulletin #92, Use of Psychiatric Medications in Pregnancy and Lactation). If desired, neonatology consultation is available in the third trimester to further discuss these  complications. I recommend early postpartum follow-up of her mood, and referral for therapy if needed.    4.  We reviewed current recommendations regarding  Flu, COVID and RSV (third trimester) vaccines by the American College of Obstetricians and Gynecologists and the Society for Maternal-Fetal Medicine. We discussed reassuring pregnancy outcome information after vaccination. We discussed the increased severity of infections and the resultant maternal and fetal complications that can arise with a severe infection including  labor.  Vaccines have been found to generate  antibodies in pregnant and lactating women similar to that observed in non-pregnant women. Vaccine-induced antibody levels were significantly greater than the levels found in response to natural infection. Immune transfer of these antibodies to neonates is found to occur via the placenta and breast milk.    Future tests recommended:  The results of her NIPT will return in 7-10 days and her OB office will order an MSAFP screen at 16-18 weeks to screen for spina bifida.       Future ultrasounds ordered today:   Fetal Level II ultrasound imaging is recommended at 19-20 weeks' gestation.    Split-shared decision-making between IDALMIS Laureano and myself was utilized, with the majority of the time spent by MANNY Laureano.  Medical decision-making for this encounter was moderate (diagnosis moderate, data moderate and risk moderate).    I reviewed the ultrasound pictures and recommended the medical decision making  transcribed in the care of this patient.        Jazlyn Donis MD

## 2024-01-10 DIAGNOSIS — O30.041 DICHORIONIC DIAMNIOTIC TWIN PREGNANCY IN FIRST TRIMESTER: Primary | ICD-10-CM

## 2024-01-10 RX ORDER — FERROUS SULFATE 324(65)MG
324 TABLET, DELAYED RELEASE (ENTERIC COATED) ORAL
Qty: 90 TABLET | Refills: 1 | Status: SHIPPED | OUTPATIENT
Start: 2024-01-10 | End: 2024-01-16

## 2024-01-11 ENCOUNTER — OFFICE VISIT (OUTPATIENT)
Dept: PHYSICAL THERAPY | Facility: CLINIC | Age: 29
End: 2024-01-11
Payer: COMMERCIAL

## 2024-01-11 ENCOUNTER — ROUTINE PRENATAL (OUTPATIENT)
Dept: OBGYN CLINIC | Facility: CLINIC | Age: 29
End: 2024-01-11

## 2024-01-11 VITALS — SYSTOLIC BLOOD PRESSURE: 140 MMHG | DIASTOLIC BLOOD PRESSURE: 84 MMHG | WEIGHT: 192.4 LBS | BODY MASS INDEX: 33.03 KG/M2

## 2024-01-11 DIAGNOSIS — Z34.01 ENCOUNTER FOR SUPERVISION OF NORMAL FIRST PREGNANCY IN FIRST TRIMESTER: Primary | ICD-10-CM

## 2024-01-11 DIAGNOSIS — K59.00 CONSTIPATION, UNSPECIFIED CONSTIPATION TYPE: ICD-10-CM

## 2024-01-11 DIAGNOSIS — R10.2 PELVIC PAIN: ICD-10-CM

## 2024-01-11 DIAGNOSIS — Z3A.13 13 WEEKS GESTATION OF PREGNANCY: ICD-10-CM

## 2024-01-11 DIAGNOSIS — N89.8 VAGINAL ODOR: ICD-10-CM

## 2024-01-11 DIAGNOSIS — G89.29 CHRONIC BILATERAL LOW BACK PAIN WITHOUT SCIATICA: Primary | ICD-10-CM

## 2024-01-11 DIAGNOSIS — M54.50 CHRONIC BILATERAL LOW BACK PAIN WITHOUT SCIATICA: Primary | ICD-10-CM

## 2024-01-11 DIAGNOSIS — M54.50 ACUTE LOW BACK PAIN, UNSPECIFIED BACK PAIN LATERALITY, UNSPECIFIED WHETHER SCIATICA PRESENT: ICD-10-CM

## 2024-01-11 DIAGNOSIS — O30.041 DICHORIONIC DIAMNIOTIC TWIN PREGNANCY IN FIRST TRIMESTER: ICD-10-CM

## 2024-01-11 PROCEDURE — 97112 NEUROMUSCULAR REEDUCATION: CPT

## 2024-01-11 PROCEDURE — 87660 TRICHOMONAS VAGIN DIR PROBE: CPT

## 2024-01-11 PROCEDURE — 87591 N.GONORRHOEAE DNA AMP PROB: CPT

## 2024-01-11 PROCEDURE — PNV

## 2024-01-11 PROCEDURE — 87480 CANDIDA DNA DIR PROBE: CPT

## 2024-01-11 PROCEDURE — 87510 GARDNER VAG DNA DIR PROBE: CPT

## 2024-01-11 PROCEDURE — 87491 CHLMYD TRACH DNA AMP PROBE: CPT

## 2024-01-11 PROCEDURE — 97140 MANUAL THERAPY 1/> REGIONS: CPT

## 2024-01-11 NOTE — PROGRESS NOTES
Patient is a 29 YO  female presenting to the office at 13w6d for routine OB care of di-di twin pregnancy.     Patient having increase in watery discharge and a strong, foul odor. Patient notes that odor is worse at the end of the day and she can often smell the odor through pants. Affirm testing done in office today, will f/u with results.     Fetal heart rate: 148.149  BP: 140/84  TWG: 10lb 6.4oz  Fetal Movement: not yet    Cramping: no  Bleeding: no  LOF: no  NT/13 week scan scheduled: yes, completed 24  Anatomy scan scheduled: yes, scheduled   AFP ordered if indicated: yes  Prenatal labs complete (including Heb B, HIV): no; discussed with patient  Pap collected: no, not due, last done 23  GC collected:yes  OK to transfuse and code  Oriented to practice/delivery location.   RTO 4 weeks

## 2024-01-11 NOTE — PROGRESS NOTES
Mark Velazquez is 07nib3g, here for her 1st prenatal appt;   not feeling baby movement yet, pt is having an increase in discharge and it has an odor.  No VB, spotting, or leakage of fluid.  Pap is UTD, GC/C due  (pap 07/07/2023wnl)

## 2024-01-11 NOTE — PROGRESS NOTES
Daily Note     Today's date: 2024  Patient name: Mark Velazquez  : 1995  MRN: [id number]  Referring provider: Aidan Conrad DO  Dx:   Encounter Diagnosis     ICD-10-CM    1. Chronic bilateral low back pain without sciatica  M54.50     G89.29       2. Pelvic pain  R10.2       3. Constipation, unspecified constipation type  K59.00       4. Acute low back pain, unspecified back pain laterality, unspecified whether sciatica present  M54.50           Start Time: 0755  Stop Time: 0855  Total time in clinic (min): 60 minutes    Subjective: Reports back pain is much better and that she is feeling pretty good today, bowel movements have been a little painful as she is now on stool softner. Now on baby aspirin, CA, and iron supplement.       Objective: See treatment diary below      Assessment: Tolerated treatment well. Patient would benefit from continued PT in order to build TA and accessory musculature to support lumbar spine. No increased pain during session. Continue to progress strengthening.       Plan: Continue per plan of care.      Insurance:  A/CMS Eval/ Re-eval POC expires FOTO Auth #/ Referral # Total    Start date  Expiration date Extension  Visit limitation?  PT only or  PT+OT? Co-Insurance   CMS 23                                                                         AUTH #:  Date               Authed: Used                Remaining                   Precautions: first trimester      Date: 23    Session: IE 2 3 4 5 6    Manuals           Left posterior innominate thrust Gr V TPR b/l glutes, erector spinae Stomach massage for increased motility TPR b/l QL, glutes, erector spinae TPR b/l QL, glutes, erector spinae TPR b/l QL, glutes, erector spinae                                  Neuro Re-Ed          360 breathing  performed        Diaphragmatic breathing          Pelvic tilts          TA engagement          TA SLR HEP 2*10 b/l  2*10 b/l        clamshells HEP  2*15 blue loop 2*15 blue loop 2*15 blue loop 2*15 blue loop    bridges  With ADD squeeze 20x With ADD squeeze 20x Bird dog series 10x b/l Bird dog series 10x b/l Bird dog series 10x b/l    Pelvic tilts on ball   P-A, east-west, cw, ccw 20x P-A, east-west, cw, ccw 20x P-A, east-west, cw, ccw 20x P-A, east-west, cw, ccw 20x                  Sit  to stand w ADD squeeze Sit  to stand w ADD squeeze Sit  to stand w ADD squeeze              Ther Ex          Standing extension  2*10 BTB  Hip burners 10x 5 sec hold, b/l Hip burners 10x 5 sec hold, b/l Hip burners 10x 5 sec hold, b/l    Standing row  2*10 BTB        Standing hip ABD/ EXT   2*10 blue loop 2*10 blue loop 2*10 blue loop 2*10 blue loop    Paloff press   15x b/l   Standing arm extension and rows with march 20x Standing arm extension and rows with march 20x                   Paloff rotation b/l 20x ea w  Paloff rotation b/l 20x ea w                                  Ther Activity                              Gait Training                              Modalities

## 2024-01-12 ENCOUNTER — APPOINTMENT (OUTPATIENT)
Dept: LAB | Facility: CLINIC | Age: 29
End: 2024-01-12
Payer: COMMERCIAL

## 2024-01-12 DIAGNOSIS — Z3A.13 13 WEEKS GESTATION OF PREGNANCY: ICD-10-CM

## 2024-01-12 DIAGNOSIS — O30.041 DICHORIONIC DIAMNIOTIC TWIN PREGNANCY IN FIRST TRIMESTER: ICD-10-CM

## 2024-01-12 DIAGNOSIS — B96.89 BACTERIAL VAGINOSIS IN PREGNANCY: Primary | ICD-10-CM

## 2024-01-12 DIAGNOSIS — Z34.01 ENCOUNTER FOR SUPERVISION OF NORMAL FIRST PREGNANCY IN FIRST TRIMESTER: ICD-10-CM

## 2024-01-12 DIAGNOSIS — Z3A.10 10 WEEKS GESTATION OF PREGNANCY: ICD-10-CM

## 2024-01-12 DIAGNOSIS — O23.599 BACTERIAL VAGINOSIS IN PREGNANCY: Primary | ICD-10-CM

## 2024-01-12 LAB
ABO GROUP BLD: NORMAL
BASOPHILS # BLD AUTO: 0.04 THOUSANDS/ÂΜL (ref 0–0.1)
BASOPHILS NFR BLD AUTO: 0 % (ref 0–1)
BLD GP AB SCN SERPL QL: NEGATIVE
C TRACH DNA SPEC QL NAA+PROBE: NEGATIVE
CANDIDA RRNA VAG QL PROBE: DETECTED
EOSINOPHIL # BLD AUTO: 0.04 THOUSAND/ÂΜL (ref 0–0.61)
EOSINOPHIL NFR BLD AUTO: 0 % (ref 0–6)
ERYTHROCYTE [DISTWIDTH] IN BLOOD BY AUTOMATED COUNT: 12.7 % (ref 11.6–15.1)
G VAGINALIS RRNA GENITAL QL PROBE: DETECTED
HBV SURFACE AG SER QL: NORMAL
HCT VFR BLD AUTO: 35.3 % (ref 34.8–46.1)
HCV AB SER QL: NORMAL
HGB BLD-MCNC: 12.4 G/DL (ref 11.5–15.4)
HIV 1+2 AB+HIV1 P24 AG SERPL QL IA: NORMAL
HIV 2 AB SERPL QL IA: NORMAL
HIV1 AB SERPL QL IA: NORMAL
HIV1 P24 AG SERPL QL IA: NORMAL
IMM GRANULOCYTES # BLD AUTO: 0.07 THOUSAND/UL (ref 0–0.2)
IMM GRANULOCYTES NFR BLD AUTO: 1 % (ref 0–2)
LYMPHOCYTES # BLD AUTO: 1.39 THOUSANDS/ÂΜL (ref 0.6–4.47)
LYMPHOCYTES NFR BLD AUTO: 14 % (ref 14–44)
MCH RBC QN AUTO: 31.1 PG (ref 26.8–34.3)
MCHC RBC AUTO-ENTMCNC: 35.1 G/DL (ref 31.4–37.4)
MCV RBC AUTO: 89 FL (ref 82–98)
MONOCYTES # BLD AUTO: 0.53 THOUSAND/ÂΜL (ref 0.17–1.22)
MONOCYTES NFR BLD AUTO: 5 % (ref 4–12)
N GONORRHOEA DNA SPEC QL NAA+PROBE: NEGATIVE
NEUTROPHILS # BLD AUTO: 8.01 THOUSANDS/ÂΜL (ref 1.85–7.62)
NEUTS SEG NFR BLD AUTO: 80 % (ref 43–75)
NRBC BLD AUTO-RTO: 0 /100 WBCS
PLATELET # BLD AUTO: 257 THOUSANDS/UL (ref 149–390)
PMV BLD AUTO: 10.6 FL (ref 8.9–12.7)
RBC # BLD AUTO: 3.99 MILLION/UL (ref 3.81–5.12)
RH BLD: POSITIVE
RUBV IGG SERPL IA-ACNC: 53.8 IU/ML
SPECIMEN EXPIRATION DATE: NORMAL
T VAGINALIS RRNA GENITAL QL PROBE: NOT DETECTED
TREPONEMA PALLIDUM IGG+IGM AB [PRESENCE] IN SERUM OR PLASMA BY IMMUNOASSAY: NORMAL
VZV IGG SER QL IA: NORMAL
WBC # BLD AUTO: 10.08 THOUSAND/UL (ref 4.31–10.16)

## 2024-01-12 PROCEDURE — 86787 VARICELLA-ZOSTER ANTIBODY: CPT

## 2024-01-12 PROCEDURE — 87086 URINE CULTURE/COLONY COUNT: CPT

## 2024-01-12 PROCEDURE — 87340 HEPATITIS B SURFACE AG IA: CPT

## 2024-01-12 PROCEDURE — 86850 RBC ANTIBODY SCREEN: CPT

## 2024-01-12 PROCEDURE — 86780 TREPONEMA PALLIDUM: CPT

## 2024-01-12 PROCEDURE — 85025 COMPLETE CBC W/AUTO DIFF WBC: CPT

## 2024-01-12 PROCEDURE — 36415 COLL VENOUS BLD VENIPUNCTURE: CPT

## 2024-01-12 PROCEDURE — 87389 HIV-1 AG W/HIV-1&-2 AB AG IA: CPT

## 2024-01-12 PROCEDURE — 86762 RUBELLA ANTIBODY: CPT

## 2024-01-12 PROCEDURE — 86803 HEPATITIS C AB TEST: CPT

## 2024-01-12 PROCEDURE — 86901 BLOOD TYPING SEROLOGIC RH(D): CPT

## 2024-01-12 PROCEDURE — 86900 BLOOD TYPING SEROLOGIC ABO: CPT

## 2024-01-12 RX ORDER — METRONIDAZOLE 500 MG/1
500 TABLET ORAL EVERY 12 HOURS SCHEDULED
Qty: 14 TABLET | Refills: 0 | Status: SHIPPED | OUTPATIENT
Start: 2024-01-12 | End: 2024-01-19

## 2024-01-14 LAB — BACTERIA UR CULT: NORMAL

## 2024-01-15 ENCOUNTER — HOSPITAL ENCOUNTER (EMERGENCY)
Facility: HOSPITAL | Age: 29
Discharge: HOME | End: 2024-01-15
Attending: EMERGENCY MEDICINE
Payer: COMMERCIAL

## 2024-01-15 ENCOUNTER — TELEPHONE (OUTPATIENT)
Dept: FAMILY MEDICINE CLINIC | Facility: CLINIC | Age: 29
End: 2024-01-15

## 2024-01-15 VITALS
SYSTOLIC BLOOD PRESSURE: 134 MMHG | WEIGHT: 191.8 LBS | DIASTOLIC BLOOD PRESSURE: 73 MMHG | BODY MASS INDEX: 32.92 KG/M2 | HEART RATE: 92 BPM | TEMPERATURE: 97.8 F | RESPIRATION RATE: 20 BRPM | OXYGEN SATURATION: 100 %

## 2024-01-15 DIAGNOSIS — R11.2 NAUSEA AND VOMITING: Primary | ICD-10-CM

## 2024-01-15 LAB
ALBUMIN SERPL BCP-MCNC: 3.7 G/DL (ref 3.5–5)
ALP SERPL-CCNC: 44 U/L (ref 34–104)
ALT SERPL W P-5'-P-CCNC: 14 U/L (ref 7–52)
ANION GAP SERPL CALCULATED.3IONS-SCNC: 9 MMOL/L
AST SERPL W P-5'-P-CCNC: 16 U/L (ref 13–39)
BASOPHILS # BLD AUTO: 0.03 THOUSANDS/ÂΜL (ref 0–0.1)
BASOPHILS NFR BLD AUTO: 0 % (ref 0–1)
BILIRUB SERPL-MCNC: 0.26 MG/DL (ref 0.2–1)
BUN SERPL-MCNC: 10 MG/DL (ref 5–25)
CALCIUM SERPL-MCNC: 8.8 MG/DL (ref 8.4–10.2)
CHLORIDE SERPL-SCNC: 103 MMOL/L (ref 96–108)
CO2 SERPL-SCNC: 22 MMOL/L (ref 21–32)
CREAT SERPL-MCNC: 0.47 MG/DL (ref 0.6–1.3)
EOSINOPHIL # BLD AUTO: 0.01 THOUSAND/ÂΜL (ref 0–0.61)
EOSINOPHIL NFR BLD AUTO: 0 % (ref 0–6)
ERYTHROCYTE [DISTWIDTH] IN BLOOD BY AUTOMATED COUNT: 13 % (ref 11.6–15.1)
GFR SERPL CREATININE-BSD FRML MDRD: 134 ML/MIN/1.73SQ M
GLUCOSE SERPL-MCNC: 146 MG/DL (ref 65–140)
HCT VFR BLD AUTO: 36.3 % (ref 34.8–46.1)
HGB BLD-MCNC: 12.2 G/DL (ref 11.5–15.4)
IMM GRANULOCYTES # BLD AUTO: 0.08 THOUSAND/UL (ref 0–0.2)
IMM GRANULOCYTES NFR BLD AUTO: 1 % (ref 0–2)
LIPASE SERPL-CCNC: 12 U/L (ref 11–82)
LYMPHOCYTES # BLD AUTO: 0.97 THOUSANDS/ÂΜL (ref 0.6–4.47)
LYMPHOCYTES NFR BLD AUTO: 8 % (ref 14–44)
MCH RBC QN AUTO: 30.2 PG (ref 26.8–34.3)
MCHC RBC AUTO-ENTMCNC: 33.6 G/DL (ref 31.4–37.4)
MCV RBC AUTO: 90 FL (ref 82–98)
MONOCYTES # BLD AUTO: 0.4 THOUSAND/ÂΜL (ref 0.17–1.22)
MONOCYTES NFR BLD AUTO: 3 % (ref 4–12)
NEUTROPHILS # BLD AUTO: 11.19 THOUSANDS/ÂΜL (ref 1.85–7.62)
NEUTS SEG NFR BLD AUTO: 88 % (ref 43–75)
NRBC BLD AUTO-RTO: 0 /100 WBCS
PLATELET # BLD AUTO: 253 THOUSANDS/UL (ref 149–390)
PMV BLD AUTO: 10.6 FL (ref 8.9–12.7)
POTASSIUM SERPL-SCNC: 3.8 MMOL/L (ref 3.5–5.3)
PROT SERPL-MCNC: 6.6 G/DL (ref 6.4–8.4)
RBC # BLD AUTO: 4.04 MILLION/UL (ref 3.81–5.12)
SODIUM SERPL-SCNC: 134 MMOL/L (ref 135–147)
WBC # BLD AUTO: 12.68 THOUSAND/UL (ref 4.31–10.16)

## 2024-01-15 PROCEDURE — 83690 ASSAY OF LIPASE: CPT

## 2024-01-15 PROCEDURE — 80053 COMPREHEN METABOLIC PANEL: CPT

## 2024-01-15 PROCEDURE — 99284 EMERGENCY DEPT VISIT MOD MDM: CPT | Performed by: EMERGENCY MEDICINE

## 2024-01-15 PROCEDURE — 99283 EMERGENCY DEPT VISIT LOW MDM: CPT

## 2024-01-15 PROCEDURE — 76815 OB US LIMITED FETUS(S): CPT | Performed by: EMERGENCY MEDICINE

## 2024-01-15 PROCEDURE — 36415 COLL VENOUS BLD VENIPUNCTURE: CPT

## 2024-01-15 PROCEDURE — 85025 COMPLETE CBC W/AUTO DIFF WBC: CPT

## 2024-01-15 NOTE — TELEPHONE ENCOUNTER
[facility]  Need for clarification  Notes to prescriber: please send a alternate medication for ferrous sulfate rx because of backorder

## 2024-01-15 NOTE — ED PROVIDER NOTES
History  Chief Complaint   Patient presents with    Vomiting     Patient here for eval of vomiting x3 days. Pt reports they are intermittent episodes. HA resolved. +Nausea and reports cold sweats. 14 weeks pregnant , Denies Bleeding/CP.     HPI    28-year-old female, , 14w3d with twins presents for evaluation of vomiting. Symptoms began 3 days ago with intermittent episodes of vomiting, particularly when she goes to have a bowel movement, and associated with abdominal cramping, and diaphoresis. She reports the abdominal cramps are related to the urge to defecate, and are relieved after having a BM. She has had 2 episodes of vomiting with bowel movements. She was recently started on Flagyl for bacterial vaginosis. The two episodes were days apart, and was not related to eating or taking her antibiotics. She reports intermittent nausea, but is able to tolerate po intake. She is not currently nauseous at time of examination. Denies chest pain, diarrhea, blood in stool, blood in emesis, dysuria, urinary frequency, vaginal bleeding or discharge.     Prior to Admission Medications   Prescriptions Last Dose Informant Patient Reported? Taking?   Rhekrc-Tzowjobvz-Chfw-Fish Oil (PRENATAL + COMPLETE MULTI PO)  Self Yes No   Sig: Take by mouth   aspirin (ECOTRIN LOW STRENGTH) 81 mg EC tablet  Self No No   Sig: Take 2 tablets (162 mg total) by mouth daily   citalopram (CeleXA) 40 mg tablet  Self No No   Sig: Take 1 tablet (40 mg total) by mouth daily   metroNIDAZOLE (FLAGYL) 500 mg tablet   No No   Sig: Take 1 tablet (500 mg total) by mouth every 12 (twelve) hours for 7 days   ondansetron (ZOFRAN) 4 mg tablet  Self No No   Sig: TAKE 1 TABLET BY MOUTH EVERY 8 HOURS AS NEEDED FOR NAUSEA FOR VOMITING ASSOCIATED  WITH  MORNING  SICKNESS   ondansetron (ZOFRAN) 4 mg tablet  Self No No   Sig: TAKE 1 TABLET BY MOUTH EVERY 8 HOURS AS NEEDED FOR NAUSEA FOR VOMITING ASSOCIATED  WITH  MORNING  SICKNESS   Patient not taking: Reported on  1/11/2024      Facility-Administered Medications: None       Past Medical History:   Diagnosis Date    Acne     Anxiety     Depression     Varicella     disease and vaccinated as a child       Past Surgical History:   Procedure Laterality Date    NO PAST SURGERIES         Family History   Problem Relation Age of Onset    Chang's esophagus Mother     No Known Problems Father     No Known Problems Brother     Breast cancer Paternal Grandfather      I have reviewed and agree with the history as documented.    E-Cigarette/Vaping    E-Cigarette Use Never User      E-Cigarette/Vaping Substances    Nicotine No     THC No     CBD No     Flavoring No     Other No     Unknown No      Social History     Tobacco Use    Smoking status: Never    Smokeless tobacco: Never   Vaping Use    Vaping status: Never Used   Substance Use Topics    Alcohol use: Not Currently     Alcohol/week: 3.0 standard drinks of alcohol     Types: 3 Standard drinks or equivalent per week     Comment: Occasionally    Drug use: No        Review of Systems   Constitutional:  Negative for chills and fever.   HENT:  Negative for ear pain and sore throat.    Eyes:  Negative for visual disturbance.   Respiratory:  Negative for cough and shortness of breath.    Cardiovascular:  Negative for chest pain, palpitations and leg swelling.   Gastrointestinal:  Positive for abdominal pain (cramping, asoociated with vomiting episodes), nausea and vomiting (2 episodes). Negative for diarrhea.   Genitourinary:  Negative for dysuria, frequency, hematuria, vaginal bleeding and vaginal discharge.   Musculoskeletal:  Negative for arthralgias and back pain.   Skin:  Negative for color change and rash.   Neurological:  Negative for seizures and syncope.   All other systems reviewed and are negative.      Physical Exam  ED Triage Vitals [01/15/24 1655]   Temperature Pulse Respirations Blood Pressure SpO2   97.8 °F (36.6 °C) 92 20 134/73 100 %      Temp Source Heart Rate Source  Patient Position - Orthostatic VS BP Location FiO2 (%)   Oral Monitor Sitting Right arm --      Pain Score       No Pain             Orthostatic Vital Signs  Vitals:    01/15/24 1655   BP: 134/73   Pulse: 92   Patient Position - Orthostatic VS: Sitting       Physical Exam  Vitals and nursing note reviewed.   Constitutional:       General: She is not in acute distress.  HENT:      Head: Normocephalic and atraumatic.      Mouth/Throat:      Mouth: Mucous membranes are moist.      Pharynx: Oropharynx is clear.   Eyes:      Extraocular Movements: Extraocular movements intact.      Conjunctiva/sclera: Conjunctivae normal.      Pupils: Pupils are equal, round, and reactive to light.   Cardiovascular:      Rate and Rhythm: Normal rate and regular rhythm.      Pulses: Normal pulses.      Heart sounds: Normal heart sounds. No murmur heard.  Pulmonary:      Effort: Pulmonary effort is normal. No respiratory distress.      Breath sounds: Normal breath sounds.   Abdominal:      General: Bowel sounds are normal.      Palpations: Abdomen is soft.      Tenderness: There is no guarding.      Comments: Abdomen appropriately distended for stage of pregnancy. Subjective tenderness in R periumbilical region. No guarding pre   Musculoskeletal:         General: No swelling.   Skin:     General: Skin is warm and dry.      Capillary Refill: Capillary refill takes less than 2 seconds.   Neurological:      Mental Status: She is alert and oriented to person, place, and time.   Psychiatric:         Mood and Affect: Mood normal.         Behavior: Behavior normal.         ED Medications  Medications - No data to display    Diagnostic Studies  Results Reviewed       Procedure Component Value Units Date/Time    Comprehensive metabolic panel [[id number]]  (Abnormal) Collected: 01/15/24 2852    Lab Status: Final result Specimen: Blood from Arm, Right Updated: 01/15/24 2532     Sodium 134 mmol/L      Potassium 3.8 mmol/L      Chloride 103 mmol/L       CO2 22 mmol/L      ANION GAP 9 mmol/L      BUN 10 mg/dL      Creatinine 0.47 mg/dL      Glucose 146 mg/dL      Calcium 8.8 mg/dL      AST 16 U/L      ALT 14 U/L      Alkaline Phosphatase 44 U/L      Total Protein 6.6 g/dL      Albumin 3.7 g/dL      Total Bilirubin 0.26 mg/dL      eGFR 134 ml/min/1.73sq m     Narrative:      National Kid[employer] guidelines for Chronic Kidney Disease (CKD):     Stage 1 with normal or high GFR (GFR > 90 mL/min/1.73 square meters)    Stage 2 Mild CKD (GFR = 60-89 mL/min/1.73 square meters)    Stage 3A Moderate CKD (GFR = 45-59 mL/min/1.73 square meters)    Stage 3B Moderate CKD (GFR = 30-44 mL/min/1.73 square meters)    Stage 4 Severe CKD (GFR = 15-29 mL/min/1.73 square meters)    Stage 5 End Stage CKD (GFR <15 mL/min/1.73 square meters)  Note: GFR calculation is accurate only with a steady state creatinine    Lipase [[id number]]  (Normal) Collected: 01/15/24 1754    Lab Status: Final result Specimen: Blood from Arm, Right Updated: 01/15/24 1825     Lipase 12 u/L     CBC and differential [[id number]]  (Abnormal) Collected: 01/15/24 1754    Lab Status: Final result Specimen: Blood from Arm, Right Updated: 01/15/24 1805     WBC 12.68 Thousand/uL      RBC 4.04 Million/uL      Hemoglobin 12.2 g/dL      Hematocrit 36.3 %      MCV 90 fL      MCH 30.2 pg      MCHC 33.6 g/dL      RDW 13.0 %      MPV 10.6 fL      Platelets 253 Thousands/uL      nRBC 0 /100 WBCs      Neutrophils Relative 88 %      Immat GRANS % 1 %      Lymphocytes Relative 8 %      Monocytes Relative 3 %      Eosinophils Relative 0 %      Basophils Relative 0 %      Neutrophils Absolute 11.19 Thousands/µL      Immature Grans Absolute 0.08 Thousand/uL      Lymphocytes Absolute 0.97 Thousands/µL      Monocytes Absolute 0.40 Thousand/µL      Eosinophils Absolute 0.01 Thousand/µL      Basophils Absolute 0.03 Thousands/µL                    No orders to display         Procedures  POC Pelvic US    Date/Time: 1/15/2024  6:07 PM    Performed by: Naeem Gill MD  Authorized by: Naeem Gill MD    Patient location:  ED  Procedure details:     Exam Type:  Diagnostic    Indications: pregnant with abdominal pain      Assessment for: evaluate fetal viability      Technique:  Transabdominal obstetric (HCG+) exam    Views obtained: uterus (transverse and sagittal)      Image quality: diagnostic      Image availability:  Images available in PACS  Uterine findings:     Fetal heart rate: identified    Interpretation:     Ultrasound impressions: normal      Pregnancy findings: intrauterine pregnancy (IUP)    Comments:      Twin gestation noted, FHT 143bpm (baby A) and 136bpm (baby B). Fetal movements present.         ED Course  ED Course as of 01/17/24 1336   Mon Paul 15, 2024   1817 FHT Baby A: 143, Baby B; 136                             SBIRT 20yo+      Flowsheet Row Most Recent Value   Initial Alcohol Screen: US AUDIT-C     1. How often do you have a drink containing alcohol? 0 Filed at: 01/15/2024 2011   2. How many drinks containing alcohol do you have on a typical day you are drinking?  0 Filed at: 01/15/2024 2011   3a. Male UNDER 65: How often do you have five or more drinks on one occasion? 0 Filed at: 01/15/2024 2011   3b. FEMALE Any Age, or MALE 65+: How often do you have 4 or more drinks on one occassion? 0 Filed at: 01/15/2024 2011   Audit-C Score 0 Filed at: 01/15/2024 2011   ASIA: How many times in the past year have you...    Used an illegal drug or used a prescription medication for non-medical reasons? Never Filed at: 01/15/2024 2011                  Medical Decision Making  Amount and/or Complexity of Data Reviewed  Labs: ordered.      29 yo female at 14 weeks and 3 days twin gestation presents for evaluation of intermittent episodes of vomiting.  Symptoms started 3 days ago, with vomiting associated with bowel movements, diaphoresis and lightheadedness.  Denies diarrhea, melena, change in stool.  Only 2  episodes, days apart.  Not related to Flagyl administration.  Denies urinary symptoms, vaginal bleeding or discharge.Physical exam unremarkable, except for some mild subjective tenderness in the periumbilical region.  Pain not induced by Valsalva maneuver, or bearing down.  Not nauseous at time of exam, and remained asymptomatic throughout stay in department.    CBC, CMP, lipase ordered, and bedside ultrasound done to measure fetal heart tones.  Labs are grossly unremarkable, except for mild leukocytosis of 12.68.      Discussed with OB/GYN resident, and she recommended close follow-up with OB/GYN.    Disposition  Final diagnoses:   Nausea and vomiting     Time reflects when diagnosis was documented in both MDM as applicable and the Disposition within this note       Time User Action Codes Description Comment    1/15/2024  8:15 PM Naeem Gill Add [R11.2] Nausea and vomiting           ED Disposition       ED Disposition   Discharge    Condition   Stable    Date/Time   Mon Paul 15, 2024 2015    Comment   Mark Velazquez discharge to home/self care.                   Follow-up Information    None         Discharge Medication List as of 1/15/2024  8:16 PM        CONTINUE these medications which have NOT CHANGED    Details   aspirin (ECOTRIN LOW STRENGTH) 81 mg EC tablet Take 2 tablets (162 mg total) by mouth daily, Starting Tue 1/9/2024, Normal      citalopram (CeleXA) 40 mg tablet Take 1 tablet (40 mg total) by mouth daily, Starting Mon 10/16/2023, Normal      metroNIDAZOLE (FLAGYL) 500 mg tablet Take 1 tablet (500 mg total) by mouth every 12 (twelve) hours for 7 days, Starting Fri 1/12/2024, Until Fri 1/19/2024, Normal      !! ondansetron (ZOFRAN) 4 mg tablet TAKE 1 TABLET BY MOUTH EVERY 8 HOURS AS NEEDED FOR NAUSEA FOR VOMITING ASSOCIATED  WITH  MORNING  SICKNESS, Normal      !! ondansetron (ZOFRAN) 4 mg tablet TAKE 1 TABLET BY MOUTH EVERY 8 HOURS AS NEEDED FOR NAUSEA FOR VOMITING ASSOCIATED  WITH  MORNING   SICKNESS, Normal      Rdmunr-Mctpgainy-Nzxp-Fish Oil (PRENATAL + COMPLETE MULTI PO) Take by mouth, Historical Med      ferrous sulfate 324 (65 Fe) mg Take 1 tablet (324 mg total) by mouth daily before breakfast, Starting Wed 1/10/2024, Until Mon 7/8/2024, Normal       !! - Potential duplicate medications found. Please discuss with provider.        No discharge procedures on file.    PDMP Review       None             ED Provider  Attending physically available and evaluated Mark Velazquez. I managed the patient along with the ED Attending.    Electronically Signed by           Naeem Gill MD  01/17/24 8220

## 2024-01-16 DIAGNOSIS — O99.011 ANEMIA DURING PREGNANCY IN FIRST TRIMESTER: Primary | ICD-10-CM

## 2024-01-16 RX ORDER — QUINIDINE GLUCONATE 324 MG
240 TABLET, EXTENDED RELEASE ORAL DAILY
Qty: 90 TABLET | Refills: 1 | Status: SHIPPED | OUTPATIENT
Start: 2024-01-16

## 2024-01-17 ENCOUNTER — TELEPHONE (OUTPATIENT)
Facility: HOSPITAL | Age: 29
End: 2024-01-17

## 2024-01-17 NOTE — TELEPHONE ENCOUNTER
----- Message from IDALMIS Mae sent at 2024  3:16 PM EST -----  Sarabjit Sharma  RN staff, I've reviewed this NIPT result which is low-risk. I sent her a [employer] results message. MSAFP should be ordered by primary OB office to be completed between 15-20 weeks gestation. I've cc'd Nelsy Paula, who is seeing the patient for her next prenatal visit to facilitate follow-up.   Thank you.    Danitza

## 2024-01-17 NOTE — TELEPHONE ENCOUNTER
Spoke with pt and provided her with the results of the NIPS, gender NOT provided.  PT instructed on MSAFP being ordered by OB and timing of the test.  PT receptive to information.  Pt did inquire what she should do for her iron.  PT states the medication is on back order and she is unable to get it at this time.  She was inquiring what to do.  Informed pt that it appears that her PCP, [facility] ordered it and was handling it.  PT still looking for recommendations since she states we were the ones to recommend the high dose iron.  Message routed to IDALMIS Bosch.

## 2024-01-27 PROCEDURE — 36415 COLL VENOUS BLD VENIPUNCTURE: CPT

## 2024-01-31 LAB
2ND TRIMESTER 4 SCREEN SERPL-IMP: NORMAL
AFP ADJ MOM SERPL: 2.5
AFP INTERP AMN-IMP: NORMAL
AFP INTERP SERPL-IMP: NORMAL
AFP INTERP SERPL-IMP: NORMAL
AFP SERPL-MCNC: 72.5 NG/ML
AGE AT DELIVERY: 29.2 YR
GA METHOD: NORMAL
GA: 16.1 WEEKS
IDDM PATIENT QL: NO
MULTIPLE PREGNANCY: NORMAL
NEURAL TUBE DEFECT RISK FETUS: 716 %